# Patient Record
Sex: FEMALE | Race: WHITE | NOT HISPANIC OR LATINO | URBAN - METROPOLITAN AREA
[De-identification: names, ages, dates, MRNs, and addresses within clinical notes are randomized per-mention and may not be internally consistent; named-entity substitution may affect disease eponyms.]

---

## 2020-01-01 ENCOUNTER — EMERGENCY (EMERGENCY)
Facility: HOSPITAL | Age: 85
LOS: 1 days | Discharge: HOME | End: 2020-01-01
Attending: EMERGENCY MEDICINE | Admitting: EMERGENCY MEDICINE
Payer: MEDICARE

## 2020-01-01 VITALS
HEART RATE: 87 BPM | WEIGHT: 115.08 LBS | OXYGEN SATURATION: 98 % | SYSTOLIC BLOOD PRESSURE: 174 MMHG | HEIGHT: 63 IN | TEMPERATURE: 98 F | RESPIRATION RATE: 18 BRPM | DIASTOLIC BLOOD PRESSURE: 83 MMHG

## 2020-01-01 VITALS
OXYGEN SATURATION: 99 % | SYSTOLIC BLOOD PRESSURE: 151 MMHG | TEMPERATURE: 98 F | HEART RATE: 81 BPM | RESPIRATION RATE: 16 BRPM | DIASTOLIC BLOOD PRESSURE: 73 MMHG

## 2020-01-01 DIAGNOSIS — Z96.642 PRESENCE OF LEFT ARTIFICIAL HIP JOINT: Chronic | ICD-10-CM

## 2020-01-01 DIAGNOSIS — Z90.710 ACQUIRED ABSENCE OF BOTH CERVIX AND UTERUS: Chronic | ICD-10-CM

## 2020-01-01 LAB
ALBUMIN SERPL ELPH-MCNC: 3.5 G/DL — SIGNIFICANT CHANGE UP (ref 3.3–5)
ALP SERPL-CCNC: 56 U/L — SIGNIFICANT CHANGE UP (ref 40–120)
ALT FLD-CCNC: 19 U/L — SIGNIFICANT CHANGE UP (ref 12–78)
ANION GAP SERPL CALC-SCNC: 7 MMOL/L — SIGNIFICANT CHANGE UP (ref 5–17)
APPEARANCE UR: CLEAR — SIGNIFICANT CHANGE UP
AST SERPL-CCNC: 22 U/L — SIGNIFICANT CHANGE UP (ref 15–37)
BASOPHILS # BLD AUTO: 0.04 K/UL — SIGNIFICANT CHANGE UP (ref 0–0.2)
BASOPHILS NFR BLD AUTO: 0.5 % — SIGNIFICANT CHANGE UP (ref 0–2)
BILIRUB SERPL-MCNC: 0.6 MG/DL — SIGNIFICANT CHANGE UP (ref 0.2–1.2)
BILIRUB UR-MCNC: NEGATIVE — SIGNIFICANT CHANGE UP
BUN SERPL-MCNC: 8 MG/DL — SIGNIFICANT CHANGE UP (ref 7–23)
CALCIUM SERPL-MCNC: 8.9 MG/DL — SIGNIFICANT CHANGE UP (ref 8.5–10.1)
CHLORIDE SERPL-SCNC: 98 MMOL/L — SIGNIFICANT CHANGE UP (ref 96–108)
CO2 SERPL-SCNC: 28 MMOL/L — SIGNIFICANT CHANGE UP (ref 22–31)
COLOR SPEC: SIGNIFICANT CHANGE UP
CREAT SERPL-MCNC: 0.6 MG/DL — SIGNIFICANT CHANGE UP (ref 0.5–1.3)
CULTURE RESULTS: NO GROWTH — SIGNIFICANT CHANGE UP
DIFF PNL FLD: ABNORMAL
EOSINOPHIL # BLD AUTO: 0.3 K/UL — SIGNIFICANT CHANGE UP (ref 0–0.5)
EOSINOPHIL NFR BLD AUTO: 3.8 % — SIGNIFICANT CHANGE UP (ref 0–6)
FLU A RESULT: SIGNIFICANT CHANGE UP
FLU A RESULT: SIGNIFICANT CHANGE UP
FLUAV AG NPH QL: SIGNIFICANT CHANGE UP
FLUBV AG NPH QL: SIGNIFICANT CHANGE UP
GLUCOSE SERPL-MCNC: 116 MG/DL — HIGH (ref 70–99)
GLUCOSE UR QL: NEGATIVE — SIGNIFICANT CHANGE UP
HCT VFR BLD CALC: 40.8 % — SIGNIFICANT CHANGE UP (ref 34.5–45)
HGB BLD-MCNC: 13.8 G/DL — SIGNIFICANT CHANGE UP (ref 11.5–15.5)
IMM GRANULOCYTES NFR BLD AUTO: 0.3 % — SIGNIFICANT CHANGE UP (ref 0–1.5)
KETONES UR-MCNC: NEGATIVE — SIGNIFICANT CHANGE UP
LACTATE SERPL-SCNC: 1.4 MMOL/L — SIGNIFICANT CHANGE UP (ref 0.7–2)
LEUKOCYTE ESTERASE UR-ACNC: NEGATIVE — SIGNIFICANT CHANGE UP
LIDOCAIN IGE QN: 186 U/L — SIGNIFICANT CHANGE UP (ref 73–393)
LYMPHOCYTES # BLD AUTO: 0.87 K/UL — LOW (ref 1–3.3)
LYMPHOCYTES # BLD AUTO: 10.9 % — LOW (ref 13–44)
MCHC RBC-ENTMCNC: 31.2 PG — SIGNIFICANT CHANGE UP (ref 27–34)
MCHC RBC-ENTMCNC: 33.8 GM/DL — SIGNIFICANT CHANGE UP (ref 32–36)
MCV RBC AUTO: 92.1 FL — SIGNIFICANT CHANGE UP (ref 80–100)
MONOCYTES # BLD AUTO: 0.56 K/UL — SIGNIFICANT CHANGE UP (ref 0–0.9)
MONOCYTES NFR BLD AUTO: 7 % — SIGNIFICANT CHANGE UP (ref 2–14)
NEUTROPHILS # BLD AUTO: 6.21 K/UL — SIGNIFICANT CHANGE UP (ref 1.8–7.4)
NEUTROPHILS NFR BLD AUTO: 77.5 % — HIGH (ref 43–77)
NITRITE UR-MCNC: NEGATIVE — SIGNIFICANT CHANGE UP
NRBC # BLD: 0 /100 WBCS — SIGNIFICANT CHANGE UP (ref 0–0)
PH UR: 8 — SIGNIFICANT CHANGE UP (ref 5–8)
PLATELET # BLD AUTO: 264 K/UL — SIGNIFICANT CHANGE UP (ref 150–400)
POTASSIUM SERPL-MCNC: 3.3 MMOL/L — LOW (ref 3.5–5.3)
POTASSIUM SERPL-SCNC: 3.3 MMOL/L — LOW (ref 3.5–5.3)
PROT SERPL-MCNC: 7.3 G/DL — SIGNIFICANT CHANGE UP (ref 6–8.3)
PROT UR-MCNC: NEGATIVE — SIGNIFICANT CHANGE UP
RBC # BLD: 4.43 M/UL — SIGNIFICANT CHANGE UP (ref 3.8–5.2)
RBC # FLD: 12.9 % — SIGNIFICANT CHANGE UP (ref 10.3–14.5)
RSV RESULT: SIGNIFICANT CHANGE UP
RSV RNA RESP QL NAA+PROBE: SIGNIFICANT CHANGE UP
SODIUM SERPL-SCNC: 133 MMOL/L — LOW (ref 135–145)
SP GR SPEC: 1.01 — SIGNIFICANT CHANGE UP (ref 1.01–1.02)
SPECIMEN SOURCE: SIGNIFICANT CHANGE UP
UROBILINOGEN FLD QL: NEGATIVE — SIGNIFICANT CHANGE UP
WBC # BLD: 8 K/UL — SIGNIFICANT CHANGE UP (ref 3.8–10.5)
WBC # FLD AUTO: 8 K/UL — SIGNIFICANT CHANGE UP (ref 3.8–10.5)

## 2020-01-01 PROCEDURE — 99284 EMERGENCY DEPT VISIT MOD MDM: CPT | Mod: 25

## 2020-01-01 PROCEDURE — 74177 CT ABD & PELVIS W/CONTRAST: CPT | Mod: 26

## 2020-01-01 PROCEDURE — 83605 ASSAY OF LACTIC ACID: CPT

## 2020-01-01 PROCEDURE — 87631 RESP VIRUS 3-5 TARGETS: CPT

## 2020-01-01 PROCEDURE — 93005 ELECTROCARDIOGRAM TRACING: CPT

## 2020-01-01 PROCEDURE — 87086 URINE CULTURE/COLONY COUNT: CPT

## 2020-01-01 PROCEDURE — 93010 ELECTROCARDIOGRAM REPORT: CPT

## 2020-01-01 PROCEDURE — 71046 X-RAY EXAM CHEST 2 VIEWS: CPT

## 2020-01-01 PROCEDURE — 83690 ASSAY OF LIPASE: CPT

## 2020-01-01 PROCEDURE — 96374 THER/PROPH/DIAG INJ IV PUSH: CPT | Mod: XU

## 2020-01-01 PROCEDURE — 96376 TX/PRO/DX INJ SAME DRUG ADON: CPT

## 2020-01-01 PROCEDURE — 80053 COMPREHEN METABOLIC PANEL: CPT

## 2020-01-01 PROCEDURE — 96375 TX/PRO/DX INJ NEW DRUG ADDON: CPT

## 2020-01-01 PROCEDURE — 96361 HYDRATE IV INFUSION ADD-ON: CPT

## 2020-01-01 PROCEDURE — 74177 CT ABD & PELVIS W/CONTRAST: CPT

## 2020-01-01 PROCEDURE — 99285 EMERGENCY DEPT VISIT HI MDM: CPT

## 2020-01-01 PROCEDURE — 81001 URINALYSIS AUTO W/SCOPE: CPT

## 2020-01-01 PROCEDURE — 36415 COLL VENOUS BLD VENIPUNCTURE: CPT

## 2020-01-01 PROCEDURE — 85027 COMPLETE CBC AUTOMATED: CPT

## 2020-01-01 PROCEDURE — 71046 X-RAY EXAM CHEST 2 VIEWS: CPT | Mod: 26

## 2020-01-01 RX ORDER — SOTALOL HCL 120 MG
1 TABLET ORAL
Qty: 0 | Refills: 0 | DISCHARGE

## 2020-01-01 RX ORDER — POTASSIUM CHLORIDE 20 MEQ
20 PACKET (EA) ORAL ONCE
Refills: 0 | Status: COMPLETED | OUTPATIENT
Start: 2020-01-01 | End: 2020-01-01

## 2020-01-01 RX ORDER — ONDANSETRON 8 MG/1
4 TABLET, FILM COATED ORAL ONCE
Refills: 0 | Status: COMPLETED | OUTPATIENT
Start: 2020-01-01 | End: 2020-01-01

## 2020-01-01 RX ORDER — SODIUM CHLORIDE 9 MG/ML
1000 INJECTION INTRAMUSCULAR; INTRAVENOUS; SUBCUTANEOUS ONCE
Refills: 0 | Status: COMPLETED | OUTPATIENT
Start: 2020-01-01 | End: 2020-01-01

## 2020-01-01 RX ORDER — PANTOPRAZOLE SODIUM 20 MG/1
40 TABLET, DELAYED RELEASE ORAL ONCE
Refills: 0 | Status: COMPLETED | OUTPATIENT
Start: 2020-01-01 | End: 2020-01-01

## 2020-01-01 RX ORDER — KETOROLAC TROMETHAMINE 30 MG/ML
15 SYRINGE (ML) INJECTION ONCE
Refills: 0 | Status: DISCONTINUED | OUTPATIENT
Start: 2020-01-01 | End: 2020-01-01

## 2020-01-01 RX ORDER — PANTOPRAZOLE SODIUM 20 MG/1
1 TABLET, DELAYED RELEASE ORAL
Qty: 0 | Refills: 0 | DISCHARGE

## 2020-01-01 RX ORDER — SIMVASTATIN 20 MG/1
1 TABLET, FILM COATED ORAL
Qty: 0 | Refills: 0 | DISCHARGE

## 2020-01-01 RX ADMIN — ONDANSETRON 4 MILLIGRAM(S): 8 TABLET, FILM COATED ORAL at 13:44

## 2020-01-01 RX ADMIN — PANTOPRAZOLE SODIUM 40 MILLIGRAM(S): 20 TABLET, DELAYED RELEASE ORAL at 16:42

## 2020-01-01 RX ADMIN — SODIUM CHLORIDE 1000 MILLILITER(S): 9 INJECTION INTRAMUSCULAR; INTRAVENOUS; SUBCUTANEOUS at 14:44

## 2020-01-01 RX ADMIN — Medication 20 MILLIEQUIVALENT(S): at 16:42

## 2020-01-01 RX ADMIN — SODIUM CHLORIDE 1000 MILLILITER(S): 9 INJECTION INTRAMUSCULAR; INTRAVENOUS; SUBCUTANEOUS at 13:44

## 2020-01-01 RX ADMIN — Medication 15 MILLIGRAM(S): at 16:42

## 2020-01-01 RX ADMIN — ONDANSETRON 4 MILLIGRAM(S): 8 TABLET, FILM COATED ORAL at 16:42

## 2020-03-01 NOTE — ED PROVIDER NOTE - ATTENDING CONTRIBUTION TO CARE
I have personally performed a face to face diagnostic evaluation on this patient.  I have reviewed the PA note and agree with the history, exam, and plan of care, except as noted.  History and Exam by me shows patient brought in by daughter c/o posterior neck pain, body aches, nausea, vomiting, unable to tolerate po, patient states last week she had urinary burning, started macrobid and symptoms improved, has 2 days left, today not feeling well, abdominal discomfort, nausea, tries to eat or drink and vomits, patient alert and oriented, heart and lungs clear, abdomen soft, no focal weakness, f/u labs, ct abdomen/pelvis, anti-emetics, iv fluids, re-eval.

## 2020-03-01 NOTE — ED ADULT NURSE NOTE - OBJECTIVE STATEMENT
89 y/o female, presents to the ed c/o of nausea and vomiting that started today. she was diagnosed with a uti on tuesday and started on macrobid. the nausea started wednesday and vomiting started today. she denies fever chills chest pain sob headache abdominal pain and dysuria.

## 2020-03-01 NOTE — ED PROVIDER NOTE - PROGRESS NOTE DETAILS
patient tolerated po in ED, no episode of vomiting, feels well, requesting discharge, daughter at bedside, results discussed, given information for GI Dr Cohen, rx zofran and protonix sent to pharmacy,  advised if any concerns return to ED,  advised stop macrobid medication, follow up with pmd Dr Moran, call tomorrow to arrange follow up , advance diet as tolerated, starting with clears

## 2020-03-01 NOTE — ED PROVIDER NOTE - OBJECTIVE STATEMENT
89 y female brought to ED by daughter, patient states she had an episode of vomiting today, as per daughter patient is presently being treated for uti, is on Macrobid, prescribed Tuesday, felt the medication has "upset her stomach", spoke with her pmd Dr Moran about the medication reaction, advised continue medication,  today she vomited went to urgent care, sent to ED for evaluation, patient denies abdominal pain, no fever, no pain with urination, no diarrhea, no recent illness, or falls, no recent travel, no sick contacts.  denies chest pain no sob.  states today has no appetitie, has not eaten, tried to drink sips of water and vomited.  PMH:  htn, hld,  had total hysterectomy 1982.

## 2020-03-01 NOTE — ED PROVIDER NOTE - CHPI ED SYMPTOMS NEG
no diarrhea/no fever/no blood in stool/no hematuria/no chills/no dysuria/no burning urination/no abdominal distension

## 2020-03-01 NOTE — ED PROVIDER NOTE - PATIENT PORTAL LINK FT
You can access the FollowMyHealth Patient Portal offered by [facility] by registering at the following website: [email or web address]. By joining [facility]’s FollowMyHealth portal, you will also be able to view your health information using other applications (apps) compatible with our system.

## 2020-03-01 NOTE — ED PROVIDER NOTE - CLINICAL SUMMARY MEDICAL DECISION MAKING FREE TEXT BOX
episode of vomiting today, recently diagnosed uti, on macrobid,  will obtain labs, ekg, ct, cxr, r/o flu, ua, give ivf, iv med

## 2020-03-01 NOTE — ED PROVIDER NOTE - NSFOLLOWUPINSTRUCTIONS_ED_ALL_ED_FT
follow up with GI Dr kirkland,call tomorrow to arrange follow up  follow up with pmd Dr Moran  stop macrobid medication  advance diet as tolerated start with clears  return to ED if any concerns

## 2020-03-01 NOTE — ED PROVIDER NOTE - CARE PROVIDER_API CALL
Markell Cohen ()  Internal Medicine  [address] [place] [address]  Phone: [phone]  Fax: [phone]  Follow Up Time:

## 2021-01-01 ENCOUNTER — INPATIENT (INPATIENT)
Facility: HOSPITAL | Age: 86
LOS: 0 days | DRG: 284 | End: 2021-01-11
Attending: INTERNAL MEDICINE | Admitting: STUDENT IN AN ORGANIZED HEALTH CARE EDUCATION/TRAINING PROGRAM
Payer: MEDICARE

## 2021-01-01 VITALS
HEIGHT: 63 IN | SYSTOLIC BLOOD PRESSURE: 119 MMHG | WEIGHT: 115.08 LBS | OXYGEN SATURATION: 98 % | TEMPERATURE: 98 F | HEART RATE: 126 BPM | DIASTOLIC BLOOD PRESSURE: 78 MMHG | RESPIRATION RATE: 28 BRPM

## 2021-01-01 VITALS — HEART RATE: 78 BPM | DIASTOLIC BLOOD PRESSURE: 42 MMHG | SYSTOLIC BLOOD PRESSURE: 65 MMHG

## 2021-01-01 DIAGNOSIS — T14.8XXA OTHER INJURY OF UNSPECIFIED BODY REGION, INITIAL ENCOUNTER: ICD-10-CM

## 2021-01-01 DIAGNOSIS — Z96.642 PRESENCE OF LEFT ARTIFICIAL HIP JOINT: Chronic | ICD-10-CM

## 2021-01-01 DIAGNOSIS — Z90.710 ACQUIRED ABSENCE OF BOTH CERVIX AND UTERUS: Chronic | ICD-10-CM

## 2021-01-01 LAB
ACANTHOCYTES BLD QL SMEAR: SLIGHT — SIGNIFICANT CHANGE UP
ALBUMIN SERPL ELPH-MCNC: 3.6 G/DL — SIGNIFICANT CHANGE UP (ref 3.3–5.2)
ALBUMIN SERPL ELPH-MCNC: 4 G/DL — SIGNIFICANT CHANGE UP (ref 3.3–5.2)
ALP SERPL-CCNC: 49 U/L — SIGNIFICANT CHANGE UP (ref 40–120)
ALP SERPL-CCNC: 49 U/L — SIGNIFICANT CHANGE UP (ref 40–120)
ALT FLD-CCNC: 14 U/L — SIGNIFICANT CHANGE UP
ALT FLD-CCNC: 17 U/L — SIGNIFICANT CHANGE UP
ANION GAP SERPL CALC-SCNC: 10 MMOL/L — SIGNIFICANT CHANGE UP (ref 5–17)
ANION GAP SERPL CALC-SCNC: 12 MMOL/L — SIGNIFICANT CHANGE UP (ref 5–17)
ANISOCYTOSIS BLD QL: SLIGHT — SIGNIFICANT CHANGE UP
APPEARANCE UR: CLEAR — SIGNIFICANT CHANGE UP
APTT BLD: 25.5 SEC — LOW (ref 27.5–35.5)
APTT BLD: 26.1 SEC — LOW (ref 27.5–35.5)
AST SERPL-CCNC: 21 U/L — SIGNIFICANT CHANGE UP
AST SERPL-CCNC: 27 U/L — SIGNIFICANT CHANGE UP
BACTERIA # UR AUTO: ABNORMAL
BASOPHILS # BLD AUTO: 0 K/UL — SIGNIFICANT CHANGE UP (ref 0–0.2)
BASOPHILS # BLD AUTO: 0.02 K/UL — SIGNIFICANT CHANGE UP (ref 0–0.2)
BASOPHILS NFR BLD AUTO: 0 % — SIGNIFICANT CHANGE UP (ref 0–2)
BASOPHILS NFR BLD AUTO: 0.1 % — SIGNIFICANT CHANGE UP (ref 0–2)
BILIRUB SERPL-MCNC: 1.2 MG/DL — SIGNIFICANT CHANGE UP (ref 0.4–2)
BILIRUB SERPL-MCNC: 1.2 MG/DL — SIGNIFICANT CHANGE UP (ref 0.4–2)
BILIRUB UR-MCNC: NEGATIVE — SIGNIFICANT CHANGE UP
BLD GP AB SCN SERPL QL: SIGNIFICANT CHANGE UP
BUN SERPL-MCNC: 29 MG/DL — HIGH (ref 8–20)
BUN SERPL-MCNC: 32 MG/DL — HIGH (ref 8–20)
BURR CELLS BLD QL SMEAR: PRESENT — SIGNIFICANT CHANGE UP
CALCIUM SERPL-MCNC: 9.2 MG/DL — SIGNIFICANT CHANGE UP (ref 8.6–10.2)
CALCIUM SERPL-MCNC: 9.3 MG/DL — SIGNIFICANT CHANGE UP (ref 8.6–10.2)
CHLORIDE SERPL-SCNC: 101 MMOL/L — SIGNIFICANT CHANGE UP (ref 98–107)
CHLORIDE SERPL-SCNC: 97 MMOL/L — LOW (ref 98–107)
CK SERPL-CCNC: 32 U/L — SIGNIFICANT CHANGE UP (ref 25–170)
CK SERPL-CCNC: 44 U/L — SIGNIFICANT CHANGE UP (ref 25–170)
CO2 SERPL-SCNC: 21 MMOL/L — LOW (ref 22–29)
CO2 SERPL-SCNC: 24 MMOL/L — SIGNIFICANT CHANGE UP (ref 22–29)
COLOR SPEC: YELLOW — SIGNIFICANT CHANGE UP
CREAT SERPL-MCNC: 0.68 MG/DL — SIGNIFICANT CHANGE UP (ref 0.5–1.3)
CREAT SERPL-MCNC: 0.79 MG/DL — SIGNIFICANT CHANGE UP (ref 0.5–1.3)
CRP SERPL-MCNC: 24.24 MG/DL — HIGH (ref 0–0.4)
CULTURE RESULTS: SIGNIFICANT CHANGE UP
DIFF PNL FLD: ABNORMAL
ELLIPTOCYTES BLD QL SMEAR: SLIGHT — SIGNIFICANT CHANGE UP
EOSINOPHIL # BLD AUTO: 0 K/UL — SIGNIFICANT CHANGE UP (ref 0–0.5)
EOSINOPHIL # BLD AUTO: 0 K/UL — SIGNIFICANT CHANGE UP (ref 0–0.5)
EOSINOPHIL NFR BLD AUTO: 0 % — SIGNIFICANT CHANGE UP (ref 0–6)
EOSINOPHIL NFR BLD AUTO: 0 % — SIGNIFICANT CHANGE UP (ref 0–6)
EPI CELLS # UR: SIGNIFICANT CHANGE UP
ERYTHROCYTE [SEDIMENTATION RATE] IN BLOOD: 38 MM/HR — HIGH (ref 0–20)
GIANT PLATELETS BLD QL SMEAR: PRESENT — SIGNIFICANT CHANGE UP
GLUCOSE BLDC GLUCOMTR-MCNC: 167 MG/DL — HIGH (ref 70–99)
GLUCOSE SERPL-MCNC: 126 MG/DL — HIGH (ref 70–99)
GLUCOSE SERPL-MCNC: 137 MG/DL — HIGH (ref 70–99)
GLUCOSE UR QL: NEGATIVE MG/DL — SIGNIFICANT CHANGE UP
HCT VFR BLD CALC: 35.9 % — SIGNIFICANT CHANGE UP (ref 34.5–45)
HCT VFR BLD CALC: 36.5 % — SIGNIFICANT CHANGE UP (ref 34.5–45)
HGB BLD-MCNC: 11.7 G/DL — SIGNIFICANT CHANGE UP (ref 11.5–15.5)
HGB BLD-MCNC: 12 G/DL — SIGNIFICANT CHANGE UP (ref 11.5–15.5)
IMM GRANULOCYTES NFR BLD AUTO: 0.7 % — SIGNIFICANT CHANGE UP (ref 0–1.5)
INR BLD: 1.36 RATIO — HIGH (ref 0.88–1.16)
INR BLD: 1.42 RATIO — HIGH (ref 0.88–1.16)
KETONES UR-MCNC: ABNORMAL
LEUKOCYTE ESTERASE UR-ACNC: ABNORMAL
LYMPHOCYTES # BLD AUTO: 1.11 K/UL — SIGNIFICANT CHANGE UP (ref 1–3.3)
LYMPHOCYTES # BLD AUTO: 1.42 K/UL — SIGNIFICANT CHANGE UP (ref 1–3.3)
LYMPHOCYTES # BLD AUTO: 7.5 % — LOW (ref 13–44)
LYMPHOCYTES # BLD AUTO: 8.8 % — LOW (ref 13–44)
MACROCYTES BLD QL: SLIGHT — SIGNIFICANT CHANGE UP
MAGNESIUM SERPL-MCNC: 2.2 MG/DL — SIGNIFICANT CHANGE UP (ref 1.8–2.6)
MAGNESIUM SERPL-MCNC: 2.2 MG/DL — SIGNIFICANT CHANGE UP (ref 1.8–2.6)
MANUAL SMEAR VERIFICATION: SIGNIFICANT CHANGE UP
MCHC RBC-ENTMCNC: 31.7 PG — SIGNIFICANT CHANGE UP (ref 27–34)
MCHC RBC-ENTMCNC: 32.1 PG — SIGNIFICANT CHANGE UP (ref 27–34)
MCHC RBC-ENTMCNC: 32.6 GM/DL — SIGNIFICANT CHANGE UP (ref 32–36)
MCHC RBC-ENTMCNC: 32.9 GM/DL — SIGNIFICANT CHANGE UP (ref 32–36)
MCV RBC AUTO: 97.3 FL — SIGNIFICANT CHANGE UP (ref 80–100)
MCV RBC AUTO: 97.6 FL — SIGNIFICANT CHANGE UP (ref 80–100)
MICROCYTES BLD QL: SLIGHT — SIGNIFICANT CHANGE UP
MONOCYTES # BLD AUTO: 1.92 K/UL — HIGH (ref 0–0.9)
MONOCYTES # BLD AUTO: 2.54 K/UL — HIGH (ref 0–0.9)
MONOCYTES NFR BLD AUTO: 13 % — SIGNIFICANT CHANGE UP (ref 2–14)
MONOCYTES NFR BLD AUTO: 15.8 % — HIGH (ref 2–14)
NEUTROPHILS # BLD AUTO: 11.62 K/UL — HIGH (ref 1.8–7.4)
NEUTROPHILS # BLD AUTO: 11.99 K/UL — HIGH (ref 1.8–7.4)
NEUTROPHILS NFR BLD AUTO: 74.5 % — SIGNIFICANT CHANGE UP (ref 43–77)
NEUTROPHILS NFR BLD AUTO: 78.7 % — HIGH (ref 43–77)
NITRITE UR-MCNC: NEGATIVE — SIGNIFICANT CHANGE UP
NT-PROBNP SERPL-SCNC: 3234 PG/ML — HIGH (ref 0–300)
NT-PROBNP SERPL-SCNC: 4983 PG/ML — HIGH (ref 0–300)
OVALOCYTES BLD QL SMEAR: SLIGHT — SIGNIFICANT CHANGE UP
PH UR: 5 — SIGNIFICANT CHANGE UP (ref 5–8)
PHOSPHATE SERPL-MCNC: 2.7 MG/DL — SIGNIFICANT CHANGE UP (ref 2.4–4.7)
PLAT MORPH BLD: NORMAL — SIGNIFICANT CHANGE UP
PLATELET # BLD AUTO: 226 K/UL — SIGNIFICANT CHANGE UP (ref 150–400)
PLATELET # BLD AUTO: 227 K/UL — SIGNIFICANT CHANGE UP (ref 150–400)
POIKILOCYTOSIS BLD QL AUTO: SLIGHT — SIGNIFICANT CHANGE UP
POLYCHROMASIA BLD QL SMEAR: SLIGHT — SIGNIFICANT CHANGE UP
POTASSIUM SERPL-MCNC: 3.9 MMOL/L — SIGNIFICANT CHANGE UP (ref 3.5–5.3)
POTASSIUM SERPL-MCNC: 4.1 MMOL/L — SIGNIFICANT CHANGE UP (ref 3.5–5.3)
POTASSIUM SERPL-SCNC: 3.9 MMOL/L — SIGNIFICANT CHANGE UP (ref 3.5–5.3)
POTASSIUM SERPL-SCNC: 4.1 MMOL/L — SIGNIFICANT CHANGE UP (ref 3.5–5.3)
PROCALCITONIN SERPL-MCNC: 0.23 NG/ML — HIGH (ref 0.02–0.1)
PROT SERPL-MCNC: 6.4 G/DL — LOW (ref 6.6–8.7)
PROT SERPL-MCNC: 7 G/DL — SIGNIFICANT CHANGE UP (ref 6.6–8.7)
PROT UR-MCNC: 100 MG/DL
PROTHROM AB SERPL-ACNC: 15.5 SEC — HIGH (ref 10.6–13.6)
PROTHROM AB SERPL-ACNC: 16.2 SEC — HIGH (ref 10.6–13.6)
RBC # BLD: 3.69 M/UL — LOW (ref 3.8–5.2)
RBC # BLD: 3.74 M/UL — LOW (ref 3.8–5.2)
RBC # FLD: 13.3 % — SIGNIFICANT CHANGE UP (ref 10.3–14.5)
RBC # FLD: 13.4 % — SIGNIFICANT CHANGE UP (ref 10.3–14.5)
RBC BLD AUTO: ABNORMAL
RBC CASTS # UR COMP ASSIST: ABNORMAL /HPF (ref 0–4)
SARS-COV-2 IGG SERPL QL IA: NEGATIVE — SIGNIFICANT CHANGE UP
SARS-COV-2 IGM SERPL IA-ACNC: 0.07 INDEX — SIGNIFICANT CHANGE UP
SARS-COV-2 RNA SPEC QL NAA+PROBE: SIGNIFICANT CHANGE UP
SCHISTOCYTES BLD QL AUTO: SLIGHT — SIGNIFICANT CHANGE UP
SODIUM SERPL-SCNC: 130 MMOL/L — LOW (ref 135–145)
SODIUM SERPL-SCNC: 135 MMOL/L — SIGNIFICANT CHANGE UP (ref 135–145)
SP GR SPEC: 1.02 — SIGNIFICANT CHANGE UP (ref 1.01–1.02)
SPECIMEN SOURCE: SIGNIFICANT CHANGE UP
TROPONIN T SERPL-MCNC: 0.06 NG/ML — SIGNIFICANT CHANGE UP (ref 0–0.06)
TROPONIN T SERPL-MCNC: 0.07 NG/ML — HIGH (ref 0–0.06)
TROPONIN T SERPL-MCNC: 0.08 NG/ML — HIGH (ref 0–0.06)
TROPONIN T SERPL-MCNC: 0.08 NG/ML — HIGH (ref 0–0.06)
UROBILINOGEN FLD QL: NEGATIVE MG/DL — SIGNIFICANT CHANGE UP
VARIANT LYMPHS # BLD: 0.9 % — SIGNIFICANT CHANGE UP (ref 0–6)
WBC # BLD: 14.77 K/UL — HIGH (ref 3.8–10.5)
WBC # BLD: 16.1 K/UL — HIGH (ref 3.8–10.5)
WBC # FLD AUTO: 14.77 K/UL — HIGH (ref 3.8–10.5)
WBC # FLD AUTO: 16.1 K/UL — HIGH (ref 3.8–10.5)
WBC UR QL: ABNORMAL

## 2021-01-01 PROCEDURE — 99291 CRITICAL CARE FIRST HOUR: CPT

## 2021-01-01 PROCEDURE — 99233 SBSQ HOSP IP/OBS HIGH 50: CPT

## 2021-01-01 PROCEDURE — 71275 CT ANGIOGRAPHY CHEST: CPT | Mod: 26

## 2021-01-01 PROCEDURE — 93010 ELECTROCARDIOGRAM REPORT: CPT | Mod: 76

## 2021-01-01 PROCEDURE — 99223 1ST HOSP IP/OBS HIGH 75: CPT

## 2021-01-01 PROCEDURE — 99497 ADVNCD CARE PLAN 30 MIN: CPT | Mod: 25

## 2021-01-01 PROCEDURE — 71045 X-RAY EXAM CHEST 1 VIEW: CPT | Mod: 26

## 2021-01-01 RX ORDER — ONDANSETRON 8 MG/1
4 TABLET, FILM COATED ORAL EVERY 6 HOURS
Refills: 0 | Status: DISCONTINUED | OUTPATIENT
Start: 2021-01-01 | End: 2021-01-01

## 2021-01-01 RX ORDER — METOPROLOL TARTRATE 50 MG
5 TABLET ORAL ONCE
Refills: 0 | Status: COMPLETED | OUTPATIENT
Start: 2021-01-01 | End: 2021-01-01

## 2021-01-01 RX ORDER — METOPROLOL TARTRATE 50 MG
25 TABLET ORAL
Refills: 0 | Status: DISCONTINUED | OUTPATIENT
Start: 2021-01-01 | End: 2021-01-01

## 2021-01-01 RX ORDER — PANTOPRAZOLE SODIUM 20 MG/1
40 TABLET, DELAYED RELEASE ORAL
Refills: 0 | Status: DISCONTINUED | OUTPATIENT
Start: 2021-01-01 | End: 2021-01-01

## 2021-01-01 RX ORDER — MORPHINE SULFATE 50 MG/1
2 CAPSULE, EXTENDED RELEASE ORAL ONCE
Refills: 0 | Status: DISCONTINUED | OUTPATIENT
Start: 2021-01-01 | End: 2021-01-01

## 2021-01-01 RX ORDER — SODIUM CHLORIDE 9 MG/ML
1000 INJECTION INTRAMUSCULAR; INTRAVENOUS; SUBCUTANEOUS ONCE
Refills: 0 | Status: COMPLETED | OUTPATIENT
Start: 2021-01-01 | End: 2021-01-01

## 2021-01-01 RX ORDER — METOPROLOL TARTRATE 50 MG
25 TABLET ORAL EVERY 8 HOURS
Refills: 0 | Status: DISCONTINUED | OUTPATIENT
Start: 2021-01-01 | End: 2021-01-01

## 2021-01-01 RX ORDER — ATROPINE SULFATE 0.1 MG/ML
0.6 SYRINGE (ML) INJECTION ONCE
Refills: 0 | Status: DISCONTINUED | OUTPATIENT
Start: 2021-01-01 | End: 2021-01-01

## 2021-01-01 RX ORDER — METOPROLOL TARTRATE 50 MG
5 TABLET ORAL EVERY 6 HOURS
Refills: 0 | Status: DISCONTINUED | OUTPATIENT
Start: 2021-01-01 | End: 2021-01-01

## 2021-01-01 RX ORDER — CEFTRIAXONE 500 MG/1
INJECTION, POWDER, FOR SOLUTION INTRAMUSCULAR; INTRAVENOUS
Refills: 0 | Status: DISCONTINUED | OUTPATIENT
Start: 2021-01-01 | End: 2021-01-01

## 2021-01-01 RX ORDER — ASPIRIN/CALCIUM CARB/MAGNESIUM 324 MG
1 TABLET ORAL
Qty: 0 | Refills: 0 | DISCHARGE

## 2021-01-01 RX ORDER — ACETAMINOPHEN 500 MG
650 TABLET ORAL EVERY 6 HOURS
Refills: 0 | Status: DISCONTINUED | OUTPATIENT
Start: 2021-01-01 | End: 2021-01-01

## 2021-01-01 RX ORDER — SIMVASTATIN 20 MG/1
40 TABLET, FILM COATED ORAL AT BEDTIME
Refills: 0 | Status: DISCONTINUED | OUTPATIENT
Start: 2021-01-01 | End: 2021-01-01

## 2021-01-01 RX ORDER — ACETAMINOPHEN 500 MG
1000 TABLET ORAL ONCE
Refills: 0 | Status: COMPLETED | OUTPATIENT
Start: 2021-01-01 | End: 2021-01-01

## 2021-01-01 RX ORDER — LABETALOL HCL 100 MG
10 TABLET ORAL ONCE
Refills: 0 | Status: DISCONTINUED | OUTPATIENT
Start: 2021-01-01 | End: 2021-01-01

## 2021-01-01 RX ORDER — LATANOPROST 0.05 MG/ML
1 SOLUTION/ DROPS OPHTHALMIC; TOPICAL
Qty: 0 | Refills: 0 | DISCHARGE

## 2021-01-01 RX ORDER — APIXABAN 2.5 MG/1
2.5 TABLET, FILM COATED ORAL ONCE
Refills: 0 | Status: DISCONTINUED | OUTPATIENT
Start: 2021-01-01 | End: 2021-01-01

## 2021-01-01 RX ORDER — ROBINUL 0.2 MG/ML
0.2 INJECTION INTRAMUSCULAR; INTRAVENOUS ONCE
Refills: 0 | Status: DISCONTINUED | OUTPATIENT
Start: 2021-01-01 | End: 2021-01-01

## 2021-01-01 RX ORDER — SOTALOL HCL 120 MG
80 TABLET ORAL
Refills: 0 | Status: DISCONTINUED | OUTPATIENT
Start: 2021-01-01 | End: 2021-01-01

## 2021-01-01 RX ORDER — LATANOPROST 0.05 MG/ML
1 SOLUTION/ DROPS OPHTHALMIC; TOPICAL AT BEDTIME
Refills: 0 | Status: DISCONTINUED | OUTPATIENT
Start: 2021-01-01 | End: 2021-01-01

## 2021-01-01 RX ORDER — CEFTRIAXONE 500 MG/1
1000 INJECTION, POWDER, FOR SOLUTION INTRAMUSCULAR; INTRAVENOUS ONCE
Refills: 0 | Status: COMPLETED | OUTPATIENT
Start: 2021-01-01 | End: 2021-01-01

## 2021-01-01 RX ORDER — ACETAMINOPHEN 500 MG
650 TABLET ORAL ONCE
Refills: 0 | Status: COMPLETED | OUTPATIENT
Start: 2021-01-01 | End: 2021-01-01

## 2021-01-01 RX ORDER — SODIUM CHLORIDE 9 MG/ML
1000 INJECTION INTRAMUSCULAR; INTRAVENOUS; SUBCUTANEOUS
Refills: 0 | Status: DISCONTINUED | OUTPATIENT
Start: 2021-01-01 | End: 2021-01-01

## 2021-01-01 RX ORDER — CEFTRIAXONE 500 MG/1
1000 INJECTION, POWDER, FOR SOLUTION INTRAMUSCULAR; INTRAVENOUS EVERY 24 HOURS
Refills: 0 | Status: DISCONTINUED | OUTPATIENT
Start: 2021-01-12 | End: 2021-01-01

## 2021-01-01 RX ORDER — ACETYLCYSTEINE 200 MG/ML
1200 VIAL (ML) MISCELLANEOUS ONCE
Refills: 0 | Status: DISCONTINUED | OUTPATIENT
Start: 2021-01-01 | End: 2021-01-01

## 2021-01-01 RX ORDER — BRIMONIDINE TARTRATE, TIMOLOL MALEATE 2; 5 MG/ML; MG/ML
1 SOLUTION/ DROPS OPHTHALMIC
Qty: 0 | Refills: 0 | DISCHARGE

## 2021-01-01 RX ORDER — ACETAMINOPHEN 500 MG
650 TABLET ORAL ONCE
Refills: 0 | Status: DISCONTINUED | OUTPATIENT
Start: 2021-01-01 | End: 2021-01-01

## 2021-01-01 RX ADMIN — Medication 2.61 MG/KG/HR: at 22:18

## 2021-01-01 RX ADMIN — Medication 25 MILLIGRAM(S): at 13:33

## 2021-01-01 RX ADMIN — Medication 400 MILLIGRAM(S): at 20:19

## 2021-01-01 RX ADMIN — SODIUM CHLORIDE 1000 MILLILITER(S): 9 INJECTION INTRAMUSCULAR; INTRAVENOUS; SUBCUTANEOUS at 19:41

## 2021-01-01 RX ADMIN — Medication 650 MILLIGRAM(S): at 13:34

## 2021-01-01 RX ADMIN — Medication 5 MILLIGRAM(S): at 16:46

## 2021-01-01 RX ADMIN — PANTOPRAZOLE SODIUM 40 MILLIGRAM(S): 20 TABLET, DELAYED RELEASE ORAL at 06:03

## 2021-01-01 RX ADMIN — Medication 80 MILLIGRAM(S): at 17:27

## 2021-01-01 RX ADMIN — Medication 5 MILLIGRAM(S): at 17:17

## 2021-01-01 RX ADMIN — Medication 400 MILLIGRAM(S): at 16:48

## 2021-01-01 RX ADMIN — Medication 650 MILLIGRAM(S): at 06:18

## 2021-01-01 RX ADMIN — CEFTRIAXONE 1000 MILLIGRAM(S): 500 INJECTION, POWDER, FOR SOLUTION INTRAMUSCULAR; INTRAVENOUS at 05:22

## 2021-01-01 RX ADMIN — MORPHINE SULFATE 2 MILLIGRAM(S): 50 CAPSULE, EXTENDED RELEASE ORAL at 22:37

## 2021-01-10 PROBLEM — E78.5 HYPERLIPIDEMIA, UNSPECIFIED: Chronic | Status: ACTIVE | Noted: 2020-01-01

## 2021-01-10 PROBLEM — I10 ESSENTIAL (PRIMARY) HYPERTENSION: Chronic | Status: ACTIVE | Noted: 2020-01-01

## 2021-01-10 NOTE — H&P ADULT - HISTORY OF PRESENT ILLNESS
CC:  Patient is a 89y old  Female who presents with a chief complaint of chest pain radiating to back     HPI: 89 year old female with CAD s/p stents (2019), AF, HLD, GERD, PUD s/p bleed (plavix d/c'd presented with generalized weakness, upper chest pain and upper back pain. These symptomas had incresed after a recent  syncopal episode and fall 2 nights ago . The patient has had worsening dementia and declining functional status recently aslo needing a walker for ambulation. In the work up of combined chest/back pain  CTA of the chest found ascending aorta with peripheral intramural hematoma/dissection extending proximally to the level of the right coronary artery and distally to involve the aortic arch and great vessels and concern for rupture.     Pt evaluated by ER, MICU, CT SX, and Cardiology. Not a candidate for service. Pt declined by MICU. BP stable. LORIE correlates with systemic cuff pressures. DC [place]. Admit to SDU. Attempted to family x2 with no response. Pt with hx dementia unable to provide HPI/ROS/PMHX/Meds. Need to confirm med rec in AM. Discussed plan with RN at bedside. DC Eliquis for new onset afib 2/2 dissection.     Attempted to call family x2 for GOC/Code Status discussion but no answer. Shani (Daughter) [phone].

## 2021-01-10 NOTE — ED ADULT NURSE REASSESSMENT NOTE - NSIMPLEMENTINTERV_GEN_ALL_ED
Implemented All Fall with Harm Risk Interventions:  [place] to call system. Call bell, personal items and telephone within reach. Instruct patient to call for assistance. Room bathroom lighting operational. Non-slip footwear when patient is off stretcher. Physically safe environment: no spills, clutter or unnecessary equipment. Stretcher in lowest position, wheels locked, appropriate side rails in place. Provide visual cue, wrist band, yellow gown, etc. Monitor gait and stability. Monitor for mental status changes and reorient to person, place, and time. Review medications for side effects contributing to fall risk. Reinforce activity limits and safety measures with patient and family. Provide visual clues: red socks.

## 2021-01-10 NOTE — H&P ADULT - ASSESSMENT
ASSESSMENT  89 y/o F with recent Syncope, Chest/Back Pain admitted for Ascending Aortic Aneurysm with intramural hematoma, new onset AFIB RVR, small pericardial effusion    PLAN:  Ascending Aortic Dissection, Intramural Hematoma, Small Pericardial Effusion  -Ascending Aortic Dilatation to 6cm with focal dissection flap and intramural hematoma extending to R Coronary Artery/Aortic Arch/Great Vessels  -not a surgical candidate per CT SX  -family does not want aggressive surgical intervention with progressive dementia/functional decline per documentation  -currently hemodynamically stable at this time declined by MICU  -Monitor Telemetry/VS Q4  -s/p LORIE placed in ER by CTSX  -Systemic BP Cuff Pressures correlate 1:1 with Arterial Line use BP cuff going forward  -Remove LORIE prior to transfer to floor  -Aggressive BP control with BB  -repeat labs AM  -trend cardiac enzymes  -repeat BNP in AM  -VTE PPX SCDs only  -NPO  -[facility]/CT SX/MICU Consulted  -Guarded Prognosis  -COVID19 PCR Negative     New Onset AFIB RVR  -Likely 2/2 Dissection Above. Cont to Monitor Telemetry  -Cardiac Enzymes q4 x3.  -Replace Electrolytes PRN.  -Metoprolol Tartrate 25mg PO Q12 for rate control titrate as able goal HR <70  -Lopressor 5mg IVP q6 PRN for HR >100 for impulse control in setting of dissection and AF RVR  -Reported GIB on Plavix previously.   -No AC at this time due to risk/benefit in setting of Dissection and hx GIB on Plavix    Small Pericardial Effusion  -Echo as above. Repeat Echo in 48 hours to evaluate interval progression.     Leukocytosis, Acute UTI  -Ceftriaxone 1g IV for dirty UA and Leukocytosis. F/u UCX pending.     NAGMA  -Trend BMP. Likely decreased cardiac output 2/2 AFIB RVR and Aortic Dissection causing acidosis. Monitor closely.     Incidental Pulm Nodule/Opacity  -Unknown if new. RUL 3x12mm Tubular Opacity on CTA. 3 Month repeat CT follow-up as outpatient.     Goals of Care  -per documentation family declined aggressive measures and surgical intervention to MICU/CT SX  -Attempted to call family x2 to discuss further GOC/Aggressive and Heroic Measures and specifically code status in this 89 year old demented female with ascending aortic aneurysm and currently not addressed at time of admission  -Please call again in AM and fill out MOLST   -Palliative Care Consulted     Dementia, Med Rec  -Supportive Care. Pt demented unable to provide HPI/ROS/PMHX or Home Meds. Attempted call family x2 no answer. Please confirm med rec in AM.

## 2021-01-10 NOTE — ED PROVIDER NOTE - CLINICAL SUMMARY MEDICAL DECISION MAKING FREE TEXT BOX
Pt. with chest pain and back pain and near syncopal episode 2 days ago. Pt. also found to be in rapid A-fib, Possibly new onset. Will check labs/EKG/CTA of chest and cardio consult.

## 2021-01-10 NOTE — ED PROVIDER NOTE - PROGRESS NOTE DETAILS
Spoke to Dr. Jones who requested getting an Echo and starting pt. on Lopressor 25mg BID. PT. awaiting to go to CT scan. Spoke to patient and family regarding the patient's symptom. Spoke to CT surgical team who is currently at the bedside to evaluate for the aortic dissection. FAmily does not want any surgery to be performed. PT. seen by Medical ICU PA and does not meet criteria to be admitted to the ICU. Pt. will be admitted under the medicine team for cardiac monitoring and blood pressure control.

## 2021-01-10 NOTE — CONSULT NOTE ADULT - ATTENDING COMMENTS
Consulted for new onset atrial fibrillation.  recent syncope and prior coronary artery disease and history of Gi bleeding.  History of enlarged aorta.  CT scan shows concealed dissection or intra mural hematoma.   patient cannot tolerate anticoagulation for her atrial fibrillation due ot her aortic enalrgemedn and intra mural hematoma. Risk of worsening hematoma. moerover, she has pericardial effusion that may be blood.  Repeat Echo in 2 days to evaluate for worsenuing pericardial effusion./  CRP and ESR check.   probabyl afib due to blood in pericardial space. or intramural hematoma an incidental finding.   nevertheless, patietn not candidate for surgery  conservative management. CT surgery consult appreaciates. ct lopressor 25 mg Q12; Metoprolol 5 mg IV prn for heart rate > 100.  Will increase lopressor tomorrow for heart rate goal of <70 atleast.   Patient will not benefit from ICU as BP stable. admit to telemetry  Critical care cardiology.  I have personally provided critical care time > 45 mins excluding time spent on separate procedures.

## 2021-01-10 NOTE — CONSULT NOTE ADULT - ASSESSMENT
89yo female w/PMH CAD s/p stent (2019 in [place]), on ASA 81, Plavix DCd d/t GI bleeding ulcer, UTIs (at least 2x/yr).  Patient presents to ED c/o weakness. Per dtr Shani Brice ([phone]) pt had recent UTI in December, Friday night she had a syncopal episode and dtr caught her lowering her to the ground. Since the fall she has been c/o chest and back pain with movement and inspiration along with generalized weakness. In ED found to be in AFib (no known Hx of) w/HR 90-120s.     New onset AFib  -TTE  -TSH  -IVF  -Lopressor 25mg PO  -Eliquis 2.5mg PO BID  -CBC in 6days to eval Hgb after starting eliquis  -Protonix 40mg PO QD for GI protection    Weakness  -generalized  -eval for UTI    If TTE w/o significant abnormalities and rate controlled, no further inpatient cardiac w/u and may f/u as outpatient.  Thank you for allowing me to participate in care of your patient.   Please call as needed.     Assessment and recommendations are final when note is signed by the attending. 
89F h/o CAD, HLD, PUD, AF and dementia presents with chest and back pain and syncopal episode two days ago. On CT chest, it was found that the patient has an intramural hematoma of the ascending aorta. Dr Tobin was consulted and he spoke with the daughter who reports recent decline in functional status and increase in dementia. She does not want surgery for her mother at this time, and we agree that the extremely high risk of open surgery would likely outweigh any benefits.     We are recommending a stat echo, MICU admission, blood pressure and heart rate control and cardiology followup. Patient will not be a candidate for surgery.    Please recall if any questions.
89 year old female with intramural hematoma of the ascending aorta, family given the patients recent decline in functional status and increase in dementia does not want surgery. At this time she is hemodynamically stabe with stable heart rates and systolic blood pressure ranging in the low teens to high 20's. She does not meet criteria for admission to ICU given stable Bp. Recommendation to continue medical management of blood pressure, hold anticoagulates follow up eccho results and cardiology follow up as well. The above assessment and plan was discussed with EICU attending Dr Milian who aggress with admit to medicine service and monitor     Time spent: 30 mins assessing presenting problems of acute illness that poses high probability  end organ damage/dysfunction.  Medical decision making inculding plan of daily care, reviewing data, reviewing radiology, discussing with multidisciplinary team, non inclusive of procedures, discussing goals of care with patient/family

## 2021-01-10 NOTE — ED PROVIDER NOTE - OBJECTIVE STATEMENT
Pt BIBA from urgent care for weakness and SOB, pt found to be in rapid afib, unknown history, pt c/o pain to chest when moving which started last night, initial room air O2 84%, tested negative for covid at urgent care Pt BIBA from urgent care for weakness and SOB, pt found to be in rapid afib, unknown history, pt c/o pain to chest when moving which started last night, initial room air O2 84%, tested negative for covid at urgent care    As per daughter, friday night pt. became pale/pt. fainted, daughter brought pt. down to the ground. NO head trauma. Pt. started to complain of chest pain after the episode. EMT came to house and evaluated the patient, family did not want to take the patient to the hospital due to Covid concerns. Saturday, chest pain was less. PT. continued to complain of chest pain and back/shoulder pain today. Pt. has hx of stent placed in 2019 in [place]. Pt. has no hx of A-fib. EMT did do an EKG at the house and did not mention anything about A-fib. Pt BIBA from urgent care for weakness and SOB, pt found to be in rapid afib, unknown history, pt c/o pain to chest when moving which started last night. As per Triage note, initial room air O2 84%,. However pt. off the oxygen with O2 sat of 93%  on RA. Pt. had  tested negative for covid at urgent care.    As per daughter, friday night pt. became pale/pt. fainted, daughter brought pt. down to the ground. NO head trauma. Pt. started to complain of chest pain after the episode. EMT came to house and evaluated the patient, family did not want to take the patient to the hospital due to Covid concerns. Saturday, chest pain was less. PT. continued to complain of chest pain and back/shoulder pain today. Pt. has hx of stent placed in 2019 in [place]. Pt. has no hx of A-fib. EMT did do an EKG at the house and did not mention anything about A-fib.

## 2021-01-10 NOTE — ED ADULT NURSE NOTE - INTERVENTIONS DEFINITIONS
[place] to call system/Call bell, personal items and telephone within reach/Non-slip footwear when patient is off stretcher/Stretcher in lowest position, wheels locked, appropriate side rails in place

## 2021-01-10 NOTE — H&P ADULT - NSHPLABSRESULTS_GEN_ALL_CORE
EKG: Atrial fibrillation with Rapid Ventricular Response  Minimal voltage criteria for LVH, may be normal variant  Septal infarct , age undetermined  Nonspecific T wave abnormality  Abnormal ECG    CXR: IMPRESSION: Basilar pleural pulmonary processes. COPD.    CTA CHEST: IMPRESSION:  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 Small pericardial effusion measures above fluid density. Aortic rupture not excluded.  Right upper lobe 3 x 12 mm tubular opacity. Three-month follow-up CT recommended    2dEcho: Summary:   1. Technically limited study.   2. Normal left atrial size.   3. There is mild concentric left ventricular hypertrophy.   4. Hyperdynamic wall motion. Left ventricular ejection fraction, by visual estimation, is >75%.   5. Normal right atrial size.   6. Normal right ventricular size and function.   7. Moderate aortic regurgitation.   8. Moderate tricuspid regurgitation.   9. Small pericardial effusion.  10. Dilatation of the ascending aorta 5.4 cm , transverse aorta and aortic root. There is a linear density noted at the Non coronary cusp. No obvious dissection flap. No prior study to compare. Cannot rule out intramural hematoma and/or concealed dissection. CT chest is more accurate to evaluate for such findings.

## 2021-01-10 NOTE — H&P ADULT - NSHPPHYSICALEXAM_GEN_ALL_CORE
Vital Signs Last 24 Hrs  T(C): 37.2 (10 Jarrell 2021 23:46), Max: 37.8 (10 Jarrell 2021 15:13)  T(F): 98.9 (10 Jarrell 2021 23:46), Max: 100.1 (10 Jarrell 2021 15:13)  HR: 94 (10 Jarrell 2021 23:46) (86 - 128)  BP: 129/85 (10 Jarrell 2021 23:46) (110/75 - 135/66)  BP(mean): 101 (10 Jarrell 2021 23:46) (101 - 101)  RR: 16 (10 Jarrell 2021 23:46) (16 - 28)  SpO2: 95% (10 Jarrell 2021 23:46) (92% - 98%)    Constitutional: Well-appearing, NAD, VSS  Head: NC/AT  Eyes: PERRL, EOMI, anicteric sclera, conjunctiva WNL  ENT: Normal Pharynx, MMM, No tonsillar exudate/erythema  Neck: Supple, Non-tender  Chest: Non-tender, no rashes  Cardio: +Tachycardia, s1/s2  Resp: BS CTA bilaterally, no wheezing/rhonchi/rales  Abd: Soft, Non-tender, Non-distended, no rebound/guarding/rigidity  MSK: moving all extremities, no motor weakness, full ROM x4  Ext: palpable distal pulses, good capillary refill, no clubbing/cyanosis/edema, +LORIE in place  Psych: appropriate, cooperative, confused  Neuro: CN II-XII grossly intact, no focal deficits  Skin: Warm/Dry. No rashes.

## 2021-01-10 NOTE — ED ADULT NURSE NOTE - NSIMPLEMENTINTERV_GEN_ALL_ED
Patient has intermittent sharp pain and swelling at the base of thumb of right hand.  Sometimes drops objects due to the pain.  Will provide Voltaren gel.  Advised that he can use ice, wrap, brace.  Will provide referral to orthopedic in [place].  
Patient has swelling both thumbs at the base of his thumbs this is a chronic condition ongoing for about a year.  He notes the steroid taper had worked for his back in the past.  He declines referral to sports medicine for steroid injection as it would require driving to [place].   
Specific interventions were implemented:

## 2021-01-10 NOTE — CONSULT NOTE ADULT - SUBJECTIVE AND OBJECTIVE BOX
History of Present Illness:  HPI: 89F h/o CAD s/p stents (2019), AF, HLD, GERD, PUD s/p bleed (plavix d/c'd), R eye blindness, UTI was brought in by ambulance today complaining of generalized weakness, upper chest pain and upper back pain which has been worsening since she had a syncopal episode and fall on Friday night. She describes feeling suddenly dizzy and blacked out and her daughter caught her and lowered her to the ground. Per the daughter, the patient has had worsening dementia and declining functional status recently, requiring a walker for ambulation.     Past Medical History  Hyperlipidemia    Hypertension        Past Surgical History  History of left hip replacement    H/O abdominal hysterectomy        MEDICATIONS  (STANDING):  metoprolol tartrate 25 milliGRAM(s) Oral two times a day  pantoprazole    Tablet 40 milliGRAM(s) Oral before breakfast    MEDICATIONS  (PRN):      Allergies: gabapentin (Rash)  Macrobid (Unknown)      SOCIAL HISTORY:  Smoker: [ ] Yes  [ x] No        PACK YEARS:                         WHEN QUIT?  ETOH use: [ ] Yes  [x ] No              FREQUENCY / QUANTITY:  Ilicit Drug use:  [ ] Yes  [x ] No  Occupation:  Live with: daughter  Assist device use: cane or rolling walker    PMD: unk  Referring Cardiologist: Robert    Relevant Family History  FAMILY HISTORY: unk      Review of Systems  GENERAL:  Fevers[] chills[] sweats[] fatigue[] weight loss[] weight gain []                weakness                      [ ] NEGATIVE  NEURO:  parathesias[] seizures []  syncope [x]  confusion []                                                                [ ] NEGATIVE                 EYES: glasses[]  blurry vision[]  discharge[] pain[] glaucoma []          R eye blindness                                                 [ ] NEGATIVE                 ENMT:  difficulty hearing []  vertigo[]  dysphagia[] epistaxis[] recent dental work []                     [x ] NEGATIVE                 CV:  chest pain[x upper chest] palpitations[] MAGANA [] diaphoresis [] edema[]             also upper back pain                                              [ ] NEGATIVE                                 RESPIRATORY:  wheezing[] SOB[] cough [] sputum[] hemoptysis[]                                                   [ ] NEGATIVE               GI:  nausea[]  vomiting []  diarrhea[] constipation [] melena []                                                          [x ] NEGATIVE            : hematuria[ ]  dysuria[ ] urgency[] incontinence[]                                                                          [x ] NEGATIVE                    MUSKULOSKELETAL:  arthritis[ ]  joint swelling [ ] muscle weakness [x ]                                            [x ] NEGATIVE                     SKIN/BREAST:  rash[ ] itching [ ]  hair loss[ ] masses[ ]                                                                          [x ] NEGATIVE                     PSYCH:  dementia [x ] depression [ ] anxiety[ ]                                                                                          [ ] NEGATIVE                        HEME/LYMPH:  bruises easily[ ] enlarged lymph nodes[ ] tender lymph nodes[ ]                           [ x] NEGATIVE                      ENDOCRINE:  cold intolerance[ ] heat intolerance[ ] polydipsia[ ]                                                      [ x] NEGATIVE                        Telemetry: -120    PHYSICAL EXAM  Vital Signs Last 24 Hrs  T(C): 37.8 (10 Jarrell 2021 15:13), Max: 37.8 (10 Jarrell 2021 15:13)  T(F): 100.1 (10 Jarrell 2021 15:13), Max: 100.1 (10 Jarrell 2021 15:13)  HR: 96 (10 Jarrell 2021 17:30) (96 - 128)  BP: 122/82 (10 Jarrell 2021 17:30) (118/88 - 135/66)  BP(mean): --  RR: 20 (10 Jarrell 2021 17:30) (18 - 28)  SpO2: 96% (10 Jarrell 2021 17:30) (92% - 98%)    General: Well nourished, well developed, no acute distress.                                                         Neuro: Normal exam oriented to person/place & time with no focal motor or sensory  deficits.                    Eyes: Normal exam of conjunctiva & lids, pupils equally reactive.   ENT: Normal exam of nasal/oral mucosa with absence of cyanosis.   Neck: Normal exam of jugular veins, trachea & thyroid.   Chest: Normal lung exam with good air movement absence of wheezes, rales, or rhonchi:                                                                          CV:  Auscultation: normal [ ] S3[ ] S4[ ] Irregular [x and rapid ] Rub[ ] Clicks[ ]  Murmurs none:[ x]systolic [ ]  diastolic [ ] holosystolic [ ]  Carotids: No Bruits[x ] Other____________ Abdominal Aorta: normal [ ] nonpalpable[ ]                                                                         GI: Normal exam of abdomen, liver & spleen with no noted masses or tenderness.                                                                                              Extremities: Normal no evidence of cyanosis or deformity Edema: none[x ]trace[ ]1+[ ]2+[ ]3+[ ]4+[ ]  Lower Extremity Pulses: Right[+2 ] Left[+1, slightly cooler extremity ]Varicosities[ ]  SKIN : Normal exam to inspection & palpation                                                           LABS:                        12.0   16.10 )-----------( 226      ( 10 Jarrell 2021 12:09 )             36.5     01-10    130<L>  |  97<L>  |  32.0<H>  ----------------------------<  137<H>  3.9   |  21.0<L>  |  0.79    Ca    9.3      10 Jarrell 2021 12:09  Mg     2.2     01-10    TPro  7.0  /  Alb  4.0  /  TBili  1.2  /  DBili  x   /  AST  27  /  ALT  17  /  AlkPhos  49  01-10    PT/INR - ( 10 Jarrell 2021 12:39 )   PT: 15.5 sec;   INR: 1.36 ratio         PTT - ( 10 Jarrell 2021 12:39 )  PTT:26.1 sec  Urinalysis Basic - ( 10 Jarrell 2021 16:01 )    Color: Yellow / Appearance: Clear / S.020 / pH: x  Gluc: x / Ketone: Trace  / Bili: Negative / Urobili: Negative mg/dL   Blood: x / Protein: 100 mg/dL / Nitrite: Negative   Leuk Esterase: Trace / RBC: 3-5 /HPF / WBC 6-10   Sq Epi: x / Non Sq Epi: Few / Bacteria: Few      CARDIAC MARKERS ( 10 Jarrell 2021 12:09 )  x     / 0.06 ng/mL / x     / x     / x        < from: CT Angio Chest w/ IV Cont (01.10.21 @ 16:06) >    IMPRESSION:  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 Small pericardial effusion measures above fluid density. Aortic rupture not excluded.    Right upper lobe 3 x 12 mm tubular opacity. Three-month follow-up CT recommended    These findings were discussed with Dr. RUT UBRRIS [phone] at 1/10/2021 4:12 PM by Dr. Ziggy Bermudez with read back confirmation.    < end of copied text >

## 2021-01-10 NOTE — ED ADULT NURSE NOTE - CHIEF COMPLAINT QUOTE
Pt BIBA from urgent care for weakness and SOB, pt found to be in rapid afib, unknown history, pt c/o pain to chest when moving which started last night, initial room air O2 84%, tested negative for covid at urgent care

## 2021-01-10 NOTE — ED ADULT NURSE NOTE - OBJECTIVE STATEMENT
pt presents to ed alert and oriented to person and place but not time. unknown patient baseline. NAD. c/o chest pain radiating to back since last night. as per family she had syncopal episode friday and has had chest pain since friday. was in rapid afib at urgent care, no hx of afib. pt lethargic but follows commands and answers questions appropriately.

## 2021-01-10 NOTE — ED ADULT NURSE REASSESSMENT NOTE - NS ED NURSE REASSESS COMMENT FT1
report given to ED CC RNs. pt brought to CC for further management in stable condition.
Pt A&Ox2, forgetful of situation easily redirected, c/o chest pain the radiated to back at this time. Pt resting comfortably, VSS, no signs of distress at this time, CM in place, awaiting bed, safety maintained, call bell in reach.

## 2021-01-10 NOTE — CONSULT NOTE ADULT - SUBJECTIVE AND OBJECTIVE BOX
[facility]                                                               Office:  [address] [address]                                                              Telephone: [phone]. Fax:[phone]                                                                        CARDIOLOGY CONSULTATION NOTE                                                                                             Consult requested by:  Dr. Lizarraga  Reason for Consultation: New Onset Afib  History obtained by: Patient and medical record   obtained: No    Chief complaint:    Patient is a 89y old  Female who presents with a chief complaint of weakness.    HPI:  89yo female w/PMH CAD s/p stent (2019 in [place]), on ASA 81, Plavix DCd d/t GI bleeding ulcer, UTIs (at least 2x/yr).  Patient presents to ED c/o weakness. Per dtr Shani Brice ([phone]) pt had recent UTI in December, Friday night she had a syncopal episode and dtr caught her lowering her to the ground. Since the fall she has been c/o chest and back pain with movement and inspiration along with generalized weakness. In ED found to be in AFib (no known Hx of) w/HR 90-120s. Denies exertional chest pain/pressure, palpitations, irregular and/or rapid heart beat, SOB, MAGANA, dizziness, orthopnea, PND, cough, edema, f/c, n/v/d, hematuria, or hematochezia.     REVIEW OF SYMPTOMS:     CONSTITUTIONAL: No fever, weight loss, or fatigue  ENMT:  No difficulty hearing, tinnitus, vertigo; No sinus or throat pain  NECK: No pain or stiffness  CARDIOVASCULAR: as per HPI  RESPIRATORY: as per HPI  : No dysuria, no hematuria   GI: No dark color stool, no melena, no diarrhea, no constipation, no abdominal pain   NEURO: as per HPI  MUSCULOSKELETAL: as per HPI  PSYCH: No agitation, no anxiety.    ALL OTHER REVIEW OF SYSTEMS ARE NEGATIVE.      PREVIOUS DIAGNOSTIC TESTING  none documented      ALLERGIES: Allergies    gabapentin (Rash)  Macrobid (Unknown)    Intolerances          PAST MEDICAL HISTORY  Hyperlipidemia    Hypertension        PAST SURGICAL HISTORY  History of left hip replacement    H/O abdominal hysterectomy        FAMILY HISTORY:      SOCIAL HISTORY:    CIGARETTES: Denies  ALCOHOL: Denies  DRUGS: Denies      CURRENT MEDICATIONS:  metoprolol tartrate 25 milliGRAM(s) Oral two times a day     acetaminophen   Tablet ..        HOME MEDICATIONS:      Vital Signs Last 24 Hrs  T(C): 37.8 (10 Jarrell 2021 15:13), Max: 37.8 (10 Jarrell 2021 15:13)  T(F): 100.1 (10 Jarrell 2021 15:13), Max: 100.1 (10 Jarrell 2021 15:13)  HR: 108 (10 Jarrell 2021 15:13) (107 - 126)  BP: 128/89 (10 Jarrell 2021 15:13) (119/78 - 135/66)  BP(mean): --  RR: 18 (10 Jarrell 2021 15:13) (18 - 28)  SpO2: 94% (10 Jarrell 2021 15:13) (93% - 98%)      PHYSICAL EXAM:  Appearance: NAD, well nourished	  Neurologic: A&Ox3, PERRL, EOMI, Grossly non-focal with full strength in all four extremities  HEENT:   Normal oral mucosa, sclera non-icteric	  Lymphatic: No cervical lymphadenopathy  Cardiovascular: Normal S1 S2, No JVD, No cardiac murmurs, No carotid bruits  Respiratory: Lungs clear to auscultation	  Psychiatry: Mood & affect appropriate  Gastrointestinal:  Soft, Non-tender, + BS, no bruits	  Extremities: Normal range of motion, No clubbing, cyanosis. trace b/l LE edema  Vascular: Peripheral pulses palpable 2+ bilaterally  Skin: No rashes, No Erythema, No ecchymoses, No cyanosis  Lines and Tubes: n/a    Intake and output:     LABS:                        12.0   16.10 )-----------( 226      ( 10 Jarrell 2021 12:09 )             36.5     01-10    130<L>  |  97<L>  |  32.0<H>  ----------------------------<  137<H>  3.9   |  21.0<L>  |  0.79    Ca    9.3      10 Jarrell 2021 12:09  Mg     2.2     01-10    TPro  7.0  /  Alb  4.0  /  TBili  1.2  /  DBili  x   /  AST  27  /  ALT  17  /  AlkPhos  49  01-10    CARDIAC MARKERS ( 10 Jarrell 2021 12:09 )  x     / 0.06 ng/mL / x     / x     / x        ;p-BNP=Serum Pro-Brain Natriuretic Peptide: 3234 pg/mL (01-10 @ 12:09)    PT/INR - ( 10 Jarrell 2021 12:39 )   PT: 15.5 sec;   INR: 1.36 ratio         PTT - ( 10 Jarrell 2021 12:39 )  PTT:26.1 sec      INTERPRETATION OF TELEMETRY: Reviewed by me.   ECG: Reviewed by me.     RADIOLOGY & ADDITIONAL STUDIES:    X-ray:  reviewed by me.     CT scan:   MRI:

## 2021-01-10 NOTE — CONSULT NOTE ADULT - SUBJECTIVE AND OBJECTIVE BOX
89y  Female  gabapentin (Rash)  Macrobid (Unknown)    CC:  Patient is a 89y old  Female who presents with a chief complaint of chest pain radiating to back     HPI: 89 year old female with CAD s/p stents (2019), AF, HLD, GERD, PUD s/p bleed (plavix d/c'd presented with generalized weakness, upper chest pain and upper back pain. These symptomas had incresed after a recent  syncopal episode and fall 2 nights ago . The patient has had worsening dementia and declining functional status recently aslo needing a walker for ambulation.     Ct surgery has seen the patient and Patient will not be a candidate for surgery, the family does not want aggressive surgical intervention. Ct service recommended  stat echo, cardiology followup and MICU admission for blood pressure and heart rate control.  on my exam I found her seeping comfortably when awoke not complaining of chest pain and hemodynamically stable at this time. The heart rate is in the high 80's and blood pressure was 112/68 with a trend over past 4-5 systolic pressures in the 110 to 120 range .        PAST MEDICAL & SURGICAL HISTORY:  Hyperlipidemia  Hypertension  History of left hip replacement  H/O abdominal hysterectomy    Vital Signs Last 24 Hrs  T(C): 37.8 (10 Jarrell 2021 15:13), Max: 37.8 (10 Jarrell 2021 15:13)  T(F): 100.1 (10 Jarrell 2021 15:13), Max: 100.1 (10 Jarrell 2021 15:13)  HR: 88 (10 Jarrell 2021 21:31) (86 - 128)  BP: 110/75 (10 Jarrell 2021 20:07) (110/75 - 135/66)  BP(mean): --  RR: 16 (10 Jarrell 2021 21:31) (16 - 28)  SpO2: 95% (10 Jarrell 2021 21:31) (92% - 98%)    I&O's Summary    LABS  01-10    130<L>  |  97<L>  |  32.0<H>  ----------------------------<  137<H>  3.9   |  21.0<L>  |  0.79  Ca    9.3      10 Jarrell 2021 12:09  Mg     2.2     01-10  TPro  7.0  /  Alb  4.0  /  TBili  1.2  /  DBili  x   /  AST  27  /  ALT  17  /  AlkPhos  49  -10                        12.0   16.10 )-----------( 226      ( 10 Jarrell 2021 12:09 )             36.5     PT/INR - ( 10 Jarrell 2021 12:39 )   PT: 15.5 sec;   INR: 1.36 ratio     PTT - ( 10 Jarrell 2021 12:39 )  PTT:26.1 sec  CARDIAC MARKERS ( 10 Jarrell 2021 12:09 )  x     / 0.06 ng/mL / x     / x     / x          LIVER FUNCTIONS - ( 10 Jarrell 2021 12:09 )  Alb: 4.0 g/dL / Pro: 7.0 g/dL / ALK PHOS: 49 U/L / ALT: 17 U/L / AST: 27 U/L / GGT: x           Urinalysis Basic - ( 10 Jarrell 2021 16:01 )  Color: Yellow / Appearance: Clear / S.020 / pH: x  Gluc: x / Ketone: Trace  / Bili: Negative / Urobili: Negative mg/dL   Blood: x / Protein: 100 mg/dL / Nitrite: Negative   Leuk Esterase: Trace / RBC: 3-5 /HPF / WBC 6-10   Sq Epi: x / Non Sq Epi: Few / Bacteria: Few    MEDICATIONS  (STANDING):  metoprolol tartrate 25 milliGRAM(s) Oral two times a day  pantoprazole    Tablet 40 milliGRAM(s) Oral before breakfast      REVIEW OF SYSTEMS:  CONSTITUTIONAL: No fever, weight loss, or fatigue  EYES: No eye pain, visual disturbances, or discharge  ENMT:  No difficulty hearing, tinnitus, vertigo; No sinus or throat pain  NECK: No pain or stiffness  BREASTS: No pain, masses, or nipple discharge  RESPIRATORY: No cough, wheezing, chills or hemoptysis; No shortness of breath  CARDIOVASCULAR: No chest pain, palpitations, dizziness, or leg swelling  GASTROINTESTINAL: No abdominal or epigastric pain. No nausea, vomiting, or hematemesis; No diarrhea or constipation. No melena or hematochezia.  GENITOURINARY: No dysuria, frequency, hematuria, or incontinence  NEUROLOGICAL: No headaches, memory loss, loss of strength, numbness, or tremors  SKIN: No itching, burning, rashes, or lesions   LYMPH NODES: No enlarged glands  ENDOCRINE: No heat or cold intolerance; No hair loss  MUSCULOSKELETAL: No joint pain or swelling; No muscle, back, or extremity pain  PSYCHIATRIC: No depression, anxiety, mood swings, or difficulty sleeping  HEME/LYMPH: No easy bruising, or bleeding gums  ALLERY AND IMMUNOLOGIC: No hives or eczema      Physicial Exam:   Constitutional: NAD, well-groomed, well-developed  HEENT: PERRLA, EOMI, no drainage or redness  Neck: No bruits; no thyromegaly or nodules,  No JVD  Back: Normal spine flexure, No CVA tenderness, No deformity or limitation of movement  Respiratory: Breath Sounds equal & clear to percussion & auscultation, no accessory muscle use  Cardiovascular: Regular rate & rhythm, normal S1, S2; no murmurs, gallops or rubs; no S3, S4  Gastrointestinal: Soft, non-tender, non distended no hepatosplenomegaly, normal bowel sounds  Extremities: No peripheral edema, No cyanosis, clubbing   Vascular: Equal and normal pulses: 2+ peripheral pulses throughout  Neurological: GCS:    A&O x 3; no sensory, motor  deficits, normal reflexes  Psychiatric: Normal mood, normal affect  Musculoskeletal: No joint pain, swelling or deformity; no limitation of movement  Skin: No rashes             89y  Female  gabapentin (Rash)  Macrobid (Unknown)    CC:  Patient is a 89y old  Female who presents with a chief complaint of chest pain radiating to back     HPI: 89 year old female with CAD s/p stents (2019), AF, HLD, GERD, PUD s/p bleed (plavix d/c'd presented with generalized weakness, upper chest pain and upper back pain. These symptomas had incresed after a recent  syncopal episode and fall 2 nights ago . The patient has had worsening dementia and declining functional status recently aslo needing a walker for ambulation.     CTA of the chest found ascending aorta with peripheral intramural hematoma/dissection extending proximally to the level of the right coronary artery and distally to involve the aortic arch and great vessels and concern for  Aortic rupture not excluded    Ct surgery has seen the patient and Patient will not be a candidate for surgery, the family does not want aggressive surgical intervention. Ct service recommended  stat echo, cardiology followup and MICU admission for blood pressure and heart rate control.  on my exam I found her seeping comfortably when awoke not complaining of chest pain and hemodynamically stable at this time. The heart rate is in the high 80's and blood pressure was 112/68 with a trend over past 4-5 systolic pressures in the 110 to 120 range .              PAST MEDICAL & SURGICAL HISTORY:  Hyperlipidemia  Hypertension  History of left hip replacement  H/O abdominal hysterectomy    Vital Signs Last 24 Hrs  T(C): 37.8 (10 Jarrell 2021 15:13), Max: 37.8 (10 Jarrell 2021 15:13)  T(F): 100.1 (10 Jarrell 2021 15:13), Max: 100.1 (10 Jarrell 2021 15:13)  HR: 88 (10 Jarrell 2021 21:31) (86 - 128)  BP: 110/75 (10 Jarrell 2021 20:07) (110/75 - 135/66)  BP(mean): --  RR: 16 (10 Jarrell 2021 21:31) (16 - 28)  SpO2: 95% (10 Jarrell 2021 21:31) (92% - 98%)    I&O's Summary    LABS  01-10    130<L>  |  97<L>  |  32.0<H>  ----------------------------<  137<H>  3.9   |  21.0<L>  |  0.79  Ca    9.3      10 Jarrell 2021 12:09  Mg     2.2     -10  TPro  7.0  /  Alb  4.0  /  TBili  1.2  /  DBili  x   /  AST  27  /  ALT  17  /  AlkPhos  49  -10                        12.0   16.10 )-----------( 226      ( 10 Jarrell 2021 12:09 )             36.5     PT/INR - ( 10 Jarrell 2021 12:39 )   PT: 15.5 sec;   INR: 1.36 ratio     PTT - ( 10 Jarrell 2021 12:39 )  PTT:26.1 sec  CARDIAC MARKERS ( 10 Jarrell 2021 12:09 )  x     / 0.06 ng/mL / x     / x     / x          LIVER FUNCTIONS - ( 10 Jarrell 2021 12:09 )  Alb: 4.0 g/dL / Pro: 7.0 g/dL / ALK PHOS: 49 U/L / ALT: 17 U/L / AST: 27 U/L / GGT: x           Urinalysis Basic - ( 10 Jarrell 2021 16:01 )  Color: Yellow / Appearance: Clear / S.020 / pH: x  Gluc: x / Ketone: Trace  / Bili: Negative / Urobili: Negative mg/dL   Blood: x / Protein: 100 mg/dL / Nitrite: Negative   Leuk Esterase: Trace / RBC: 3-5 /HPF / WBC 6-10   Sq Epi: x / Non Sq Epi: Few / Bacteria: Few    MEDICATIONS  (STANDING):  metoprolol tartrate 25 milliGRAM(s) Oral two times a day  pantoprazole    Tablet 40 milliGRAM(s) Oral before breakfast      REVIEW OF SYSTEMS:  CONSTITUTIONAL: No fever, weight loss, or fatigue  EYES: No eye pain, visual disturbances, or discharge  ENMT:  No difficulty hearing, tinnitus, vertigo; No sinus or throat pain  NECK: No pain or stiffness  BREASTS: No pain, masses, or nipple discharge  RESPIRATORY: No cough, wheezing, chills or hemoptysis; No shortness of breath  CARDIOVASCULAR: No chest pain, palpitations, dizziness, or leg swelling  GASTROINTESTINAL: No abdominal or epigastric pain. No nausea, vomiting, or hematemesis; No diarrhea or constipation. No melena or hematochezia.  GENITOURINARY: No dysuria, frequency, hematuria, or incontinence  NEUROLOGICAL: No headaches, memory loss, loss of strength, numbness, or tremors  SKIN: No itching, burning, rashes, or lesions   LYMPH NODES: No enlarged glands  ENDOCRINE: No heat or cold intolerance; No hair loss  MUSCULOSKELETAL: No joint pain or swelling; No muscle, back, or extremity pain  PSYCHIATRIC: No depression, anxiety, mood swings, or difficulty sleeping  HEME/LYMPH: No easy bruising, or bleeding gums  ALLERY AND IMMUNOLOGIC: No hives or eczema      Physicial Exam:   Constitutional: NAD, well-groomed, well-developed  HEENT: PERRLA, EOMI, no drainage or redness  Neck: No bruits; no thyromegaly or nodules,  No JVD  Back: Normal spine flexure, No CVA tenderness, No deformity or limitation of movement  Respiratory: Breath Sounds equal & clear to percussion & auscultation, no accessory muscle use  Cardiovascular: Regular rate & rhythm, normal S1, S2; no murmurs, gallops or rubs; no S3, S4  Gastrointestinal: Soft, non-tender, non distended no hepatosplenomegaly, normal bowel sounds  Extremities: No peripheral edema, No cyanosis, clubbing   Vascular: Equal and normal pulses: 2+ peripheral pulses throughout  Neurological: GCS:    A&O x 3; no sensory, motor  deficits, normal reflexes  Psychiatric: Normal mood, normal affect  Musculoskeletal: No joint pain, swelling or deformity; no limitation of movement  Skin: No rashes             89y  Female  gabapentin (Rash)  Macrobid (Unknown)    CC:  Patient is a 89y old  Female who presents with a chief complaint of chest pain radiating to back     HPI: 89 year old female with CAD s/p stents (2019), AF, HLD, GERD, PUD s/p bleed (plavix d/c'd presented with generalized weakness, upper chest pain and upper back pain. These symptomas had incresed after a recent  syncopal episode and fall 2 nights ago . The patient has had worsening dementia and declining functional status recently aslo needing a walker for ambulation. In the work up of combined chest/back pain  CTA of the chest found ascending aorta with peripheral intramural hematoma/dissection extending proximally to the level of the right coronary artery and distally to involve the aortic arch and great vessels and concern for rupture.     Ct surgery has been consulted and Patient will not be a candidate for surgery, the family does not want aggressive surgical intervention. Ct service recommended stat echo, cardiology followup and MICU admission for blood pressure and heart rate control.  on my exam I found her seeping comfortably when awoke not complaining of chest pain and hemodynamically stable at this time. The heart rate is in the high 80's and blood pressure was 112/68 with a trend over past 4-5 systolic pressures in the 110 to 120 range .              PAST MEDICAL & SURGICAL HISTORY:  Hyperlipidemia  Hypertension  History of left hip replacement  H/O abdominal hysterectomy    Vital Signs Last 24 Hrs  T(C): 37.8 (10 Jarrell 2021 15:13), Max: 37.8 (10 Jarrell 2021 15:13)  T(F): 100.1 (10 Jarrell 2021 15:13), Max: 100.1 (10 Jarrell 2021 15:13)  HR: 88 (10 Jarrell 2021 21:31) (86 - 128)  BP: 110/75 (10 Jarrell 2021 20:07) (110/75 - 135/66)  BP(mean): --  RR: 16 (10 Jarrell 2021 21:31) (16 - 28)  SpO2: 95% (10 Jarrell 2021 21:31) (92% - 98%)    I&O's Summary    LABS  01-10    130<L>  |  97<L>  |  32.0<H>  ----------------------------<  137<H>  3.9   |  21.0<L>  |  0.79  Ca    9.3      10 Jarrell 2021 12:09  Mg     2.2     -10  TPro  7.0  /  Alb  4.0  /  TBili  1.2  /  DBili  x   /  AST  27  /  ALT  17  /  AlkPhos  49  10                        12.0   16.10 )-----------( 226      ( 10 Jarrell 2021 12:09 )             36.5     PT/INR - ( 10 Jarrell 2021 12:39 )   PT: 15.5 sec;   INR: 1.36 ratio     PTT - ( 10 Jarrell 2021 12:39 )  PTT:26.1 sec  CARDIAC MARKERS ( 10 Jarrell 2021 12:09 )  x     / 0.06 ng/mL / x     / x     / x          LIVER FUNCTIONS - ( 10 Jarrell 2021 12:09 )  Alb: 4.0 g/dL / Pro: 7.0 g/dL / ALK PHOS: 49 U/L / ALT: 17 U/L / AST: 27 U/L / GGT: x           Urinalysis Basic - ( 10 Jarrell 2021 16:01 )  Color: Yellow / Appearance: Clear / S.020 / pH: x  Gluc: x / Ketone: Trace  / Bili: Negative / Urobili: Negative mg/dL   Blood: x / Protein: 100 mg/dL / Nitrite: Negative   Leuk Esterase: Trace / RBC: 3-5 /HPF / WBC 6-10   Sq Epi: x / Non Sq Epi: Few / Bacteria: Few    MEDICATIONS  (STANDING):  metoprolol tartrate 25 milliGRAM(s) Oral two times a day  pantoprazole    Tablet 40 milliGRAM(s) Oral before breakfast      REVIEW OF SYSTEMS:  CONSTITUTIONAL: No fever, weight loss, or fatigue  EYES: No eye pain, visual disturbances, or discharge  ENMT:  No difficulty hearing, tinnitus, vertigo; No sinus or throat pain  NECK: No pain or stiffness  BREASTS: No pain, masses, or nipple discharge  RESPIRATORY: No cough, wheezing, chills or hemoptysis; No shortness of breath  CARDIOVASCULAR: No chest pain, palpitations, dizziness, or leg swelling  GASTROINTESTINAL: No abdominal or epigastric pain. No nausea, vomiting, or hematemesis; No diarrhea or constipation. No melena or hematochezia.  GENITOURINARY: No dysuria, frequency, hematuria, or incontinence  NEUROLOGICAL: No headaches, memory loss, loss of strength, numbness, or tremors  SKIN: No itching, burning, rashes, or lesions   LYMPH NODES: No enlarged glands  ENDOCRINE: No heat or cold intolerance; No hair loss  MUSCULOSKELETAL: No joint pain or swelling; No muscle, back, or extremity pain  PSYCHIATRIC: No depression, anxiety, mood swings, or difficulty sleeping  HEME/LYMPH: No easy bruising, or bleeding gums  ALLERY AND IMMUNOLOGIC: No hives or eczema      Physicial Exam:   Constitutional: NAD, well-groomed, well-developed  HEENT: PERRLA, EOMI, no drainage or redness  Neck: No bruits; no thyromegaly or nodules,  No JVD  Back: Normal spine flexure, No CVA tenderness, No deformity or limitation of movement  Respiratory: Breath Sounds equal & clear to percussion & auscultation, no accessory muscle use  Cardiovascular: Regular rate & rhythm, normal S1, S2; no murmurs, gallops or rubs; no S3, S4  Gastrointestinal: Soft, non-tender, non distended no hepatosplenomegaly, normal bowel sounds  Extremities: No peripheral edema, No cyanosis, clubbing   Vascular: Equal and normal pulses: 2+ peripheral pulses throughout  Neurological: GCS:    A&O x 3; no sensory, motor  deficits, normal reflexes  Psychiatric: Normal mood, normal affect  Musculoskeletal: No joint pain, swelling or deformity; no limitation of movement  Skin: No rashes             89y  Female  gabapentin (Rash)  Macrobid (Unknown)    CC:  Patient is a 89y old  Female who presents with a chief complaint of chest pain radiating to back     HPI: 89 year old female with CAD s/p stents (2019), AF, HLD, GERD, PUD s/p bleed (plavix d/c'd presented with generalized weakness, upper chest pain and upper back pain. These symptomas had incresed after a recent  syncopal episode and fall 2 nights ago . The patient has had worsening dementia and declining functional status recently aslo needing a walker for ambulation. In the work up of combined chest/back pain  CTA of the chest found ascending aorta with peripheral intramural hematoma/dissection extending proximally to the level of the right coronary artery and distally to involve the aortic arch and great vessels and concern for rupture.     Ct surgery has been consulted and Patient will not be a candidate for surgery, the family does not want aggressive surgical intervention. Ct service recommended stat echo, cardiology followup and MICU admission for blood pressure and heart rate control.  on my exam I found her seeping comfortably when awoke not complaining of chest pain and hemodynamically stable at this time. The heart rate is in the high 80's and blood pressure was 112/68 with a trend over past 4-5 systolic pressures in the 110 to 120 range .     PAST MEDICAL & SURGICAL HISTORY:  Hyperlipidemia  Hypertension  History of left hip replacement  H/O abdominal hysterectomy    Vital Signs Last 24 Hrs  T(C): 37.8 (10 Jarrell 2021 15:13), Max: 37.8 (10 Jarrell 2021 15:13)  T(F): 100.1 (10 Jarrell 2021 15:13), Max: 100.1 (10 Jarrell 2021 15:13)  HR: 88 (10 Jarrell 2021 21:31) (86 - 128)  BP: 110/75 (10 Jarrell 2021 20:07) (110/75 - 135/66)  BP(mean): --  RR: 16 (10 Jarrell 2021 21:31) (16 - 28)  SpO2: 95% (10 Jarrell 2021 21:31) (92% - 98%)    I&O's Summary    LABS  01-10    130<L>  |  97<L>  |  32.0<H>  ----------------------------<  137<H>  3.9   |  21.0<L>  |  0.79  Ca    9.3      10 Jarrell 2021 12:09  Mg     2.2     -10  TPro  7.0  /  Alb  4.0  /  TBili  1.2  /  DBili  x   /  AST  27  /  ALT  17  /  AlkPhos  49  10                        12.0   16.10 )-----------( 226      ( 10 Jarrell 2021 12:09 )             36.5     PT/INR - ( 10 Jarrell 2021 12:39 )   PT: 15.5 sec;   INR: 1.36 ratio     PTT - ( 10 Jarrell 2021 12:39 )  PTT:26.1 sec  CARDIAC MARKERS ( 10 Jarrell 2021 12:09 )  x     / 0.06 ng/mL / x     / x     / x          LIVER FUNCTIONS - ( 10 Jarrell 2021 12:09 )  Alb: 4.0 g/dL / Pro: 7.0 g/dL / ALK PHOS: 49 U/L / ALT: 17 U/L / AST: 27 U/L / GGT: x           Urinalysis Basic - ( 10 Jarrell 2021 16:01 )  Color: Yellow / Appearance: Clear / S.020 / pH: x  Gluc: x / Ketone: Trace  / Bili: Negative / Urobili: Negative mg/dL   Blood: x / Protein: 100 mg/dL / Nitrite: Negative   Leuk Esterase: Trace / RBC: 3-5 /HPF / WBC 6-10   Sq Epi: x / Non Sq Epi: Few / Bacteria: Few    MEDICATIONS  (STANDING):  metoprolol tartrate 25 milliGRAM(s) Oral two times a day  pantoprazole    Tablet 40 milliGRAM(s) Oral before breakfast      REVIEW OF SYSTEMS:  CONSTITUTIONAL: No fever, weight loss, or fatigue  EYES: No eye pain, visual disturbances, or discharge  ENMT:  No difficulty hearing, tinnitus, vertigo; No sinus or throat pain  NECK: No pain or stiffness  BREASTS: No pain, masses, or nipple discharge  RESPIRATORY: No cough, wheezing, chills or hemoptysis; No shortness of breath  CARDIOVASCULAR: No chest pain, palpitations, dizziness, or leg swelling  GASTROINTESTINAL: No abdominal or epigastric pain. No nausea, vomiting, or hematemesis; No diarrhea or constipation. No melena or hematochezia.  GENITOURINARY: No dysuria, frequency, hematuria, or incontinence  NEUROLOGICAL: No headaches, memory loss, loss of strength, numbness, or tremors  SKIN: No itching, burning, rashes, or lesions   LYMPH NODES: No enlarged glands  ENDOCRINE: No heat or cold intolerance; No hair loss  MUSCULOSKELETAL: No joint pain or swelling; No muscle, back, or extremity pain  PSYCHIATRIC: No depression, anxiety, mood swings, or difficulty sleeping  HEME/LYMPH: No easy bruising, or bleeding gums  ALLERY AND IMMUNOLOGIC: No hives or eczema      Physicial Exam:   Constitutional: NAD, well-groomed, well-developed  HEENT: PERRLA, EOMI, no drainage or redness  Neck:   No JVD  Respiratory: Breath Sounds equal & clear to percussion & auscultation, no accessory muscle use  Cardiovascular: Regular rate & rhythm, normal S1, S2; no murmurs, gallops or rubs; no S3, S4  Gastrointestinal: Soft, non-tender, non distended no hepatosplenomegaly, normal bowel sounds  Extremities: No peripheral edema, No cyanosis, clubbing   Vascular: Equal and normal pulses: 2+ peripheral pulses throughout  Neurological:  no sensory, motor  deficits, normal reflexes  Musculoskeletal: No joint pain, swelling or deformity; no limitation of movement  Skin: No rashes

## 2021-01-11 NOTE — PROGRESS NOTE ADULT - SUBJECTIVE AND OBJECTIVE BOX
[place] [facility]                                                       [facility]                                                               Office: [address] [address]                                                              Telephone: [phone]. Fax:[phone]                                                                             PROGRESS NOTE  Reason for follow up: Ascending Aortic Dissection, New Onset AFIB RVR, Small Pericardial Effusion  Update: 1/11: Patient resting comfortably on 2L NC O2sat @98%. c/t report chest discomfort and back pain w/inspiration and movement. CM now in SR.      Review of symptoms:   All review of systems negative unless indicated otherwise above.     	  Vitals:  T(C): 36.3 (01-11-21 @ 05:45), Max: 37.8 (01-10-21 @ 15:13)  HR: 85 (01-11-21 @ 08:00) (85 - 128)  BP: 112/66 (01-11-21 @ 08:00) (106/40 - 135/66)  RR: 18 (01-11-21 @ 08:00) (16 - 28)  SpO2: 98% (01-11-21 @ 08:00) (92% - 98%)  Wt(kg): --  I&O's Summary    Weight (kg): 52.2 (01-10 @ 11:40)      PHYSICAL EXAM:  Appearance: NAD, well nourished	  Neurologic: A&Ox3, PERRL, EOMI, Grossly non-focal with full strength in all four extremities  HEENT:   Normal oral mucosa, sclera non-icteric	  Lymphatic: No cervical lymphadenopathy  Cardiovascular: Normal S1 S2, No JVD, No cardiac murmurs, No carotid bruits  Respiratory: Lungs clear to auscultation	  Psychiatry: Mood & affect appropriate  Gastrointestinal:  Soft, Non-tender, + BS, no bruits	  Extremities: Normal range of motion, No clubbing, cyanosis or edema  Vascular: Peripheral pulses palpable 2+ bilaterally  Skin: No rashes, No Erythema, No ecchymoses, No cyanosis  Lines and Tubes: n/a      CURRENT MEDICATIONS:  metoprolol tartrate 25 milliGRAM(s) Oral two times a day  metoprolol tartrate Injectable 5 milliGRAM(s) IV Push every 6 hours PRN    cefTRIAXone   IVPB  pantoprazole    Tablet      DIAGNOSTIC TESTING:  [ ] Echocardiogram:   [ ]  Catheterization:  [ ] Stress Test:    OTHER: 	      LABS:	 	  CARDIAC MARKERS ( 11 Jan 2021 01:53 )  x     / 0.07 ng/mL / 32 U/L / x     / x      p-BNP 11 Jan 2021 01:53: x    , CARDIAC MARKERS ( 10 Jarrell 2021 12:09 )  x     / 0.06 ng/mL / x     / x     / x      p-BNP 10 Jarrell 2021 12:09: 3234 pg/mL                          11.7   14.77 )-----------( 227      ( 11 Jan 2021 09:14 )             35.9     01-10    130<L>  |  97<L>  |  32.0<H>  ----------------------------<  137<H>  3.9   |  21.0<L>  |  0.79    Ca    9.3      10 Jarrell 2021 12:09  Mg     2.2     01-10    TPro  7.0  /  Alb  4.0  /  TBili  1.2  /  DBili  x   /  AST  27  /  ALT  17  /  AlkPhos  49  01-10    proBNP: Serum Pro-Brain Natriuretic Peptide: 3234 pg/mL (01-10 @ 12:09)    Lipid Profile:   HgA1c:   TSH: Thyroid Stimulating Hormone, Serum: <0.10 uIU/mL        TELEMETRY: Reviewed    ECG:  Reviewed by me. 	                                                                      [place] [facility]                                                       [facility]                                                               Office: [address] [address]                                                              Telephone: [phone]. Fax:[phone]                                                                             PROGRESS NOTE  Reason for follow up: Ascending Aortic Dissection, New Onset AFIB RVR, Small Pericardial Effusion  Update: 1/11: Patient resting comfortably on 2L NC O2sat @98%. c/t report chest discomfort and back pain w/inspiration and movement. CM now in SR.      Review of symptoms:   All review of systems negative unless indicated otherwise above.     	  Vitals:  T(C): 36.3 (01-11-21 @ 05:45), Max: 37.8 (01-10-21 @ 15:13)  HR: 85 (01-11-21 @ 08:00) (85 - 128)  BP: 112/66 (01-11-21 @ 08:00) (106/40 - 135/66)  RR: 18 (01-11-21 @ 08:00) (16 - 28)  SpO2: 98% (01-11-21 @ 08:00) (92% - 98%)  Wt(kg): --  I&O's Summary    Weight (kg): 52.2 (01-10 @ 11:40)      PHYSICAL EXAM:  Appearance: NAD, well nourished	  Neurologic: A&Ox3, PERRL, EOMI, Grossly non-focal with full strength in all four extremities  HEENT:   Normal oral mucosa, sclera non-icteric	  Lymphatic: No cervical lymphadenopathy  Cardiovascular: Normal S1 S2, No JVD, No cardiac murmurs, No carotid bruits  Respiratory: Lungs clear to auscultation	  Psychiatry: Mood & affect appropriate  Gastrointestinal:  Soft, Non-tender, + BS, no bruits	  Extremities: Normal range of motion, No clubbing, cyanosis. trace b/l LE edema  Vascular: Peripheral pulses palpable 2+ bilaterally  Skin: No rashes, No Erythema, No ecchymoses, No cyanosis  Lines and Tubes: n/a      CURRENT MEDICATIONS:  metoprolol tartrate 25 milliGRAM(s) Oral two times a day  metoprolol tartrate Injectable 5 milliGRAM(s) IV Push every 6 hours PRN    cefTRIAXone   IVPB  pantoprazole    Tablet      DIAGNOSTIC TESTING:  [ ] Echocardiogram:   < from: TTE Echo Complete w/o Doppler (01.10.21 @ 17:44) >  Summary:   1. Technically limited study.   2. Normal left atrial size.   3. There is mild concentric left ventricular hypertrophy.   4. Hyperdynamic wall motion. Left ventricular ejection fraction, by visual estimation, is >75%.  5. Normal right atrial size.   6. Normal right ventricular size and function.   7. Moderate aortic regurgitation.   8. Moderate tricuspid regurgitation.   9. Small pericardial effusion.  10. Dilatation of the ascending aorta 5.4 cm , transverse aortaand aortic root. There is a linear density noted at the Non coronary cusp. No obvious dissection flap. No prior study to compare. Cannot rule out intramural hematoma and/or concealed dissection. CT chest is more accurate to evaluate for such findings.    MD Alexa, RPVI Electronically signed on 1/10/2021 at 6:51:50 PM    < end of copied text >    [ ]  Catheterization:  [ ] Stress Test:    OTHER: 	  CT:  < from: CT Angio Chest w/ IV Cont (01.10.21 @ 16:06) >  IMPRESSION:  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 Small pericardial effusion measures above fluid density. Aortic rupture not excluded.    Right upper lobe 3 x 12 mm tubular opacity. Three-month follow-up CT recommended    < end of copied text >      LABS:	 	  CARDIAC MARKERS ( 11 Jan 2021 01:53 )  x     / 0.07 ng/mL / 32 U/L / x     / x      p-BNP 11 Jan 2021 01:53: x    , CARDIAC MARKERS ( 10 Jarrell 2021 12:09 )  x     / 0.06 ng/mL / x     / x     / x      p-BNP 10 Jarrell 2021 12:09: 3234 pg/mL                          11.7   14.77 )-----------( 227      ( 11 Jan 2021 09:14 )             35.9     01-10    130<L>  |  97<L>  |  32.0<H>  ----------------------------<  137<H>  3.9   |  21.0<L>  |  0.79    Ca    9.3      10 Jarrell 2021 12:09  Mg     2.2     01-10    TPro  7.0  /  Alb  4.0  /  TBili  1.2  /  DBili  x   /  AST  27  /  ALT  17  /  AlkPhos  49  01-10    proBNP: Serum Pro-Brain Natriuretic Peptide: 3234 pg/mL (01-10 @ 12:09)    Lipid Profile:   HgA1c:   TSH: Thyroid Stimulating Hormone, Serum: <0.10 uIU/mL        TELEMETRY: now SR 80-90s

## 2021-01-11 NOTE — GOALS OF CARE CONVERSATION - ADVANCED CARE PLANNING - CONVERSATION DETAILS
D/W patient- meaning of CPR/ Ventialtion/ DNr/ DNI. Pt opts for DNR/ DNI but wants me to discuss with dgtr too.  D/W dgtr- She agrees with DNr/ DNI

## 2021-01-11 NOTE — PROGRESS NOTE ADULT - ASSESSMENT
ASSESSMENT  89 year old female with Dementia ?, CAD s/p stents (2019), AF, HLD, GERD, PUD s/p bleed (plavix d/c'd) presented with Syncope    here with Ascending Aortic Dissection, New Onset AFIB RVR, Small Pericardial Effusion    # Syncope  likely sec to Afib RVR noted on presentation  COVID19 PCR Negative    # Incidental finding of Ascending Aortic Dissection 6 cm, Intramural Hematoma, Small Pericardial Effusion  -not a surgical candidate per CT SX  -family does not want aggressive surgical intervention with progressive dementia/functional decline per documentation  -currently hemodynamically stable at this time declined by MICU  -Aggressive BP control with BB  -she has pericardial effusion that may be blood.  -Repeat Echo in 2 days to evaluate for worsening pericardial effusion    #Small Pericardial Effusion  -as above     # New Onset AFIB s/p RVR  -likely due to blood in pericardial space. or intramural hematoma an incidental finding  -Reported GIB on Plavix previously.   -No AC at this time due to risk/benefit in setting of Dissection and hx GIB on Plavix  -lopressor 25 mg Q12 now increased to Q8 hrs; Metoprolol 5 mg IV prn for heart rate > 100.  -heart rate goal of <70 atleast.     # Leukocytosis, Acute UTI  -Ceftriaxone 1g IV for dirty UA and Leukocytosis. F/u UCX pending.     # s/p NAGMA  Likely decreased cardiac output 2/2 AFIB RVR and Aortic Dissection causing acidosis.   Resolved    # Incidental Pulm Nodule/Opacity  -Unknown if new. RUL 3x12mm Tubular Opacity on CTA. 3 Month repeat CT follow-up as outpatient.     ICDs    GoC- Pt wants to be DNR/ DNI but wants me to talk to her dgtr also  Prognosis- gaurded. Palliative consulted for possible Hospice involvement   LOS- 1-2 days  Dispo- likely home         ASSESSMENT  89 year old female with Dementia ?, CAD s/p stents (2019), AF, HLD, GERD, PUD s/p bleed (plavix d/c'd) presented with Syncope    here with Ascending Aortic Dissection, New Onset AFIB RVR, Small Pericardial Effusion    # Syncope  likely sec to Afib RVR noted on presentation  COVID19 PCR Negative    # Incidental finding of Ascending Aortic Dissection 6 cm, Intramural Hematoma, Small Pericardial Effusion  -not a surgical candidate per CT SX  -family does not want aggressive surgical intervention with progressive dementia/functional decline per documentation  -currently hemodynamically stable at this time declined by MICU  -Aggressive BP control with BB  -she has pericardial effusion that may be blood.  -Repeat Echo in 2 days to evaluate for worsening pericardial effusion    #Small Pericardial Effusion  -as above     # New Onset AFIB s/p RVR  -likely due to blood in pericardial space. or intramural hematoma an incidental finding  -Reported GIB on Plavix previously.   -No AC at this time due to risk/benefit in setting of Dissection and hx GIB on Plavix  -lopressor 25 mg Q12 now increased to Q8 hrs; Metoprolol 5 mg IV prn for heart rate > 100.  -heart rate goal of <70 atleast.     # NSTEMI  likely sec to demand ischemia from A fib  trend Trops  No antiplatelets, anticoag sec to risk of bleed    # Leukocytosis, Acute UTI  -Ceftriaxone 1g IV for dirty UA and Leukocytosis. F/u UCX pending.     # s/p NAGMA  Likely decreased cardiac output 2/2 AFIB RVR and Aortic Dissection causing acidosis.   Resolved    # Incidental Pulm Nodule/Opacity  -Unknown if new. RUL 3x12mm Tubular Opacity on CTA. 3 Month repeat CT follow-up as outpatient.     ICDs    GoC- Pt wants to be DNR/ DNI but wants me to talk to her dgtr also  Prognosis- gaurded. Palliative consulted for possible Hospice involvement   LOS- 1-2 days  Dispo- likely home  Dgtr called. med list obtained and updated.         ASSESSMENT  89 year old female with Dementia ?, CAD s/p stents (2019), AF, HLD, GERD, PUD s/p bleed (plavix d/c'd), steroid dependent RA presented with Syncope    here with Ascending Aortic Dissection, New Onset AFIB RVR, Small Pericardial Effusion    # Syncope  likely sec to Afib RVR noted on presentation  COVID19 PCR Negative    # Incidental finding of Ascending Aortic Dissection 6 cm, Intramural Hematoma, Small Pericardial Effusion  -not a surgical candidate per CT SX  -family does not want aggressive surgical intervention with progressive dementia/functional decline per documentation  -currently hemodynamically stable at this time declined by MICU  -Aggressive BP control with BB  -she has pericardial effusion that may be blood.  -Repeat Echo in 2 days to evaluate for worsening pericardial effusion    #Small Pericardial Effusion  -as above     # Paroxysmal AFIB s/p RVR  -likely due to blood in pericardial space. or intramural hematoma an incidental finding  -Reported GIB on Plavix previously.   -No AC at this time due to risk/benefit in setting of Dissection and hx GIB on Plavix  -lopressor 25 mg Q12 now increased to Q8 hrs; Metoprolol 5 mg IV prn for heart rate > 100.  -heart rate goal of <70 atleast.   - after confirming home meds- pt is on sotolol 80 bid. restart same here now. and if rate controlled with that dose, then can follow with her regular cardiologist outpt post discharge    # NSTEMI  likely sec to demand ischemia from A fib  trend Trops  No antiplatelets, anticoag sec to risk of bleed    # Leukocytosis, Acute UTI  -Ceftriaxone 1g IV for dirty UA and Leukocytosis. F/u UCX pending.     # s/p NAGMA  Likely decreased cardiac output 2/2 AFIB RVR and Aortic Dissection causing acidosis.   Resolved    # steroid dependent RA  resume Prednisone    # Incidental Pulm Nodule/Opacity  -Unknown if new. RUL 3x12mm Tubular Opacity on CTA. 3 Month repeat CT follow-up as outpatient.     ICDs    GoC- DNR/ DNI  Prognosis- gaurded.   LOS- 1-2 days  Dispo- likely home  Dgtr called. med list obtained and updated.

## 2021-01-11 NOTE — PROGRESS NOTE ADULT - ATTENDING COMMENTS
as per updated info from Dr. john. Patient already has parox afib on sotallol 80 mg Q12.   Dr john confirmed with duaghter and pharmacy.   at this age, patietn has been toelrating sotalolo. we will continue that and defer the outpatient sotalol long term managemnt to his primary cardiologist  rate control and BP control for aortic aneruysym.   Chest pain: tyelinol  patient not candiadte for surgery./

## 2021-01-11 NOTE — PROGRESS NOTE ADULT - ASSESSMENT
Full note to follow 89yo female w/PMH CAD s/p stent (2019 in [place]), on ASA 81, Plavix DCd d/t GI bleeding ulcer, UTIs (at least 2x/yr).  Patient presents to ED c/o weakness. Per dtr Shani Brice ([phone]) pt had recent UTI in December, Friday night she had a syncopal episode and dtr caught her lowering her to the ground. Since the fall she has been c/o chest and back pain with movement and inspiration along with generalized weakness. In ED found to be in AFib (no known Hx of) w/HR 90-120s.     1/11: Patient resting comfortably on 2L NC O2sat @98%. c/t report chest discomfort and back pain w/inspiration and movement. CM now in SR.    New onset AFib  -now SR  -TTE 1/10 w/EF>75%. Dilatation of the ascending aorta 5.4 cm , transverse aortaand aortic root. There is a linear density noted at the Non coronary cusp. No obvious dissection flap. small pericardial effusion  -TSH <0.10  -pAF likely d/t Hyperthyroid vs blood in pericardial space vs intramural hematoma (incidental finding)  -increase to Lopressor 50mg PO BID for goal HR<70  -NO AC d/t aortic enlargement and intra mural hematoma      Pericardial effusion, intra mural hematoma  -CRP 24.24  -ESR pending  -pericardial effusion may be blood  -Repeat TTE 1/12 to eval for worsening pericardial effusion  -not surgical candidate    Hyperthyroid  -TSH <0.10  -endo eval    Assessment and recommendations are final when note is signed by the attending.      89yo female w/PMH CAD s/p stent ([place] in [place]), on ASA 81, Plavix DCd d/t GI bleeding ulcer, UTIs (at least 2x/yr).  Patient presents to ED c/o weakness. Per dtr Shani Brice ([phone]) pt had recent UTI in December, Friday night she had a syncopal episode and dtr caught her lowering her to the ground. Since the fall she has been c/o chest and back pain with movement and inspiration along with generalized weakness. In ED found to be in AFib (no known Hx of) w/HR 90-120s.     1/11: Patient resting comfortably on 2L NC O2sat @98%. c/t report chest discomfort and back pain w/inspiration and movement. CM now in SR.    New onset AFib  -now SR  -TTE 1/10 w/EF>75%. Dilatation of the ascending aorta 5.4 cm , transverse aortaand aortic root. There is a linear density noted at the Non coronary cusp. No obvious dissection flap. small pericardial effusion  -TSH <0.10  -pAF likely d/t Hyperthyroid vs blood in pericardial space vs intramural hematoma (incidental finding)  -increase to Lopressor 25mg PO q8hrs for goal HR<70  -NO AC d/t aortic enlargement and intra mural hematoma      Pericardial effusion, intra mural hematoma  -CRP 24.24  -ESR 38  -pericardial effusion may be blood  -Repeat TTE 1/12 to eval for worsening pericardial effusion  -not surgical candidate    Hyperthyroid  -TSH <0.10  -endo eval    Assessment and recommendations are final when note is signed by the attending.

## 2021-01-11 NOTE — RAPID RESPONSE TEAM SUMMARY - NSDISPOSITIONRRT_GEN_ALL_CORE
Other
Alert and oriented to person, place and time, memory intact, behavior appropriate to situation, PERRL.

## 2021-01-11 NOTE — RAPID RESPONSE TEAM SUMMARY - NSSITUATIONBACKGROUNDRRT_GEN_ALL_CORE
RR was called by bedside nurse. Pt had acute mental status change.  At my arrival pt presented peripheral (extremities x 4) and perioral cyanosis. Minimal response to verbal stimulus.  Pt co abdominal pain and difficulty breathing   Vitals : BP 57/28, HR 80,RR30, SPO2 100 % on 6 LTs nasal cannula, Blood sugar 167  Pt is DNI/DNR MOLST on file  Family was called to inform pt is critical ill. Informed patient could pass away very soon. Family is coming to hospital pt's Daughter confirmed DNI/DNR     Background  89 year old female with Dementia, CAD s/p stents (2019), AF, HLD, GERD, PUD s/p bleed (plavix d/c'd), steroid dependent RA presented with Syncope to ER    Incidental finding in the ED Ascending Aortic Dissection 6 cm, Intramural Hematoma, New Onset AFIB RVR, Small Pericardial Effusion  -not a surgical candidate per CT SX  -family does not want aggressive surgical intervention with progressive dementia/functional decline per documentation

## 2021-01-11 NOTE — PROGRESS NOTE ADULT - REASON FOR ADMISSION
Ascending Aortic Dissection, New Onset AFIB RVR, Small Pericardial Effusion
Ascending Aortic Dissection, New Onset AFIB RVR, Small Pericardial Effusion

## 2021-01-12 NOTE — DISCHARGE NOTE FOR THE EXPIRED PATIENT - HOSPITAL COURSE
01/10/2021: 89 year old female with CAD s/p stents (2019), AF, HLD, GERD, PUD s/p bleed (plavix d/c'd) presented with generalized weakness, upper chest pain and upper back pain. These symptoms had increased after a recent  syncopal episode and fall 2 nights ago . The patient has had worsening dementia and declining functional status recently also needing a walker for ambulation.  In the work up of combined chest/back pain  CTA of the chest found ascending aorta with peripheral intramural hematoma/dissection extending proximally to the level of the right coronary artery and distally to involve the aortic arch and great vessels and concern for rupture. She also has New Onset AFIB RVR and  Pericardial Effusion  Pt evaluated by ER, MICU, CT SX, and Cardiology. No candidate x MICU. Daughter  does not want surgery for her mother at this time, and CTS agreed that the extremely high risk of open surgery would likely outweigh any benefits.     01/11/2021  COVID19 PCR Negative  Continue AFIB s/p RVR  -likely due to blood in pericardial space. or intramural hematoma an incidental finding  -Reported GIB on Plavix previously.   -No AC at this time due to risk/benefit in setting of Dissection and hx GIB on Plavix  NSTEMI  -likely sec to demand ischemia from A fib  - Leukocytosis, Acute UTI  Prognosis- gaurded. as per Dr Vazquez     RR called  Family notified. Pt DNR/DNI. Started comfort care with family at bedside     01/10/2021: 89 year old female with CAD s/p stents (2019), AF, HLD, GERD, PUD s/p bleed (plavix d/c'd) presented with generalized weakness, upper chest pain and upper back pain. These symptoms had increased after a recent  syncopal episode and fall 2 nights ago . The patient has had worsening dementia and declining functional status recently also needing a walker for ambulation.  In the work up of combined chest/back pain  CTA of the chest found ascending aorta with peripheral intramural hematoma/dissection extending proximally to the level of the right coronary artery and distally to involve the aortic arch and great vessels and concern for rupture. She also has New Onset AFIB RVR and  Pericardial Effusion  Pt evaluated by ER, MICU, CT SX, and Cardiology. No candidate x MICU. Daughter  does not want surgery for her mother at this time, and CTS agreed that the extremely high risk of open surgery would likely outweigh any benefits.     2021  COVID19 PCR Negative  Continue AFIB s/p RVR  -likely due to blood in pericardial space. or intramural hematoma an incidental finding  -Reported GIB on Plavix previously.   -No AC at this time due to risk/benefit in setting of Dissection and hx GIB on Plavix  NSTEMI  -likely sec to demand ischemia from A fib  - Leukocytosis, Acute UTI  Prognosis- gaurded. as per Dr Vazquez     RR was called by bedside nurse. Pt had acute mental status change.  At my arrival pt presented peripheral (extremities x 4) and perioral cyanosis. Minimal response to verbal stimulus.  Pt co abdominal pain and difficulty breathing   Vitals : BP 57/28, HR 80,RR30, SPO2 100 % on 6 LTs nasal cannula, Blood sugar 167  Pt is DNI/DNR MOLST on file  Family was called to inform pt is critical ill. Informed patient could pass away very soon. Family is coming to hospital pt's Daughter confirmed DNI/DNR. Started comfort care with family at bedside.  23:40 I did not observe spontaneous breathing or appreciate heat sound on auscultation There was no palpable radial/carotid pulses. The patient did not respond to nail bed stimuli. I examined the patient and there was no pupillary response to light or touch  Patient was pronounced  at 23:40 on 2021. Family at bedside

## 2021-01-21 PROCEDURE — 80053 COMPREHEN METABOLIC PANEL: CPT

## 2021-01-21 PROCEDURE — U0003: CPT

## 2021-01-21 PROCEDURE — 93005 ELECTROCARDIOGRAM TRACING: CPT

## 2021-01-21 PROCEDURE — 86769 SARS-COV-2 COVID-19 ANTIBODY: CPT

## 2021-01-21 PROCEDURE — 86900 BLOOD TYPING SEROLOGIC ABO: CPT

## 2021-01-21 PROCEDURE — 87086 URINE CULTURE/COLONY COUNT: CPT

## 2021-01-21 PROCEDURE — 85730 THROMBOPLASTIN TIME PARTIAL: CPT

## 2021-01-21 PROCEDURE — 85610 PROTHROMBIN TIME: CPT

## 2021-01-21 PROCEDURE — 71045 X-RAY EXAM CHEST 1 VIEW: CPT

## 2021-01-21 PROCEDURE — 96375 TX/PRO/DX INJ NEW DRUG ADDON: CPT | Mod: XU

## 2021-01-21 PROCEDURE — 82550 ASSAY OF CK (CPK): CPT

## 2021-01-21 PROCEDURE — 84484 ASSAY OF TROPONIN QUANT: CPT

## 2021-01-21 PROCEDURE — 83880 ASSAY OF NATRIURETIC PEPTIDE: CPT

## 2021-01-21 PROCEDURE — 36415 COLL VENOUS BLD VENIPUNCTURE: CPT

## 2021-01-21 PROCEDURE — 85025 COMPLETE CBC W/AUTO DIFF WBC: CPT

## 2021-01-21 PROCEDURE — C1889: CPT

## 2021-01-21 PROCEDURE — U0005: CPT

## 2021-01-21 PROCEDURE — 84100 ASSAY OF PHOSPHORUS: CPT

## 2021-01-21 PROCEDURE — 86850 RBC ANTIBODY SCREEN: CPT

## 2021-01-21 PROCEDURE — 85652 RBC SED RATE AUTOMATED: CPT

## 2021-01-21 PROCEDURE — 86140 C-REACTIVE PROTEIN: CPT

## 2021-01-21 PROCEDURE — 93307 TTE W/O DOPPLER COMPLETE: CPT

## 2021-01-21 PROCEDURE — 82962 GLUCOSE BLOOD TEST: CPT

## 2021-01-21 PROCEDURE — 84443 ASSAY THYROID STIM HORMONE: CPT

## 2021-01-21 PROCEDURE — 99291 CRITICAL CARE FIRST HOUR: CPT | Mod: 25

## 2021-01-21 PROCEDURE — 83735 ASSAY OF MAGNESIUM: CPT

## 2021-01-21 PROCEDURE — 86901 BLOOD TYPING SEROLOGIC RH(D): CPT

## 2021-01-21 PROCEDURE — 84145 PROCALCITONIN (PCT): CPT

## 2021-01-21 PROCEDURE — 71275 CT ANGIOGRAPHY CHEST: CPT

## 2021-01-21 PROCEDURE — 96374 THER/PROPH/DIAG INJ IV PUSH: CPT | Mod: XU

## 2021-01-21 PROCEDURE — 81001 URINALYSIS AUTO W/SCOPE: CPT

## 2021-07-07 NOTE — ED ADULT TRIAGE NOTE - RESPIRATORY RATE (BREATHS/MIN)
Subjective:      Patient ID: Jenny Gonzalez is a 32 y.o. female who presents today for:     Chief Complaint   Patient presents with    Menstrual Problem       HPI Pt reports that recently her PMS has been worse than normal lately. She reports that she will get angry for small things that shouldn't upset her. It has always been there, but it hasn't been this bad or lasted this long before. It now will be longer than a week of around her period. She has been on prozac and zoloft in the past. The prozac had bad side effects. She does not remember why she stopped zoloft, but does not recall anything bad. The OCP is working well for pain and bleeding. Past Medical History:   Diagnosis Date    Abnormal Pap smear of cervix     Depression     Hypertension     Mixed hyperlipidemia      No past surgical history on file. Family History   Problem Relation Age of Onset    High Blood Pressure Mother     Breast Cancer Neg Hx      Social History     Socioeconomic History    Marital status: Single     Spouse name: Not on file    Number of children: Not on file    Years of education: Not on file    Highest education level: Not on file   Occupational History    Not on file   Tobacco Use    Smoking status: Never Smoker    Smokeless tobacco: Never Used   Substance and Sexual Activity    Alcohol use: No     Alcohol/week: 0.0 standard drinks    Drug use: No    Sexual activity: Yes     Partners: Male   Other Topics Concern    Not on file   Social History Narrative    Not on file     Social Determinants of Health     Financial Resource Strain: Low Risk     Difficulty of Paying Living Expenses: Not hard at all   Food Insecurity: No Food Insecurity    Worried About [address] in the Last Year: Never true    [address] in the Last Year: Never true   Transportation Needs: No Transportation Needs    Lack of Transportation (Medical): No    Lack of Transportation (Non-Medical):  No   Physical No orders of the defined types were placed in this encounter. Orders Placed This Encounter   Medications    sertraline (ZOLOFT) 50 MG tablet     Sig: Take 0.5 tab po daily x 2 weeks then take 1 tab po daily. Dispense:  30 tablet     Refill:  1       Return in about 1 month (around 8/7/2021). Start medication as prescribed. Discussed that it typically takes about 4-6 weeks to take full effect. Discussed possibility of SI. If any SI pt should stop medication immediately and seek help. Pt was agreeable. Reviewed with the patient: current clinicalstatus, medications, activities and diet. Side effects, adverse effects of the medication prescribedtoday, as well as treatment plan/ rationale and result expectations have been discussedwith the patient who expresses understanding and desires to proceed. Close follow upto evaluate treatment results and for coordination of care. I have reviewedthe patient's medical history in detail and updated the computerized patient record.     Kathi Mckeon, APRN - CNP 18

## 2024-01-22 NOTE — ED ADULT TRIAGE NOTE - IDEAL BODY WEIGHT(KG)
Called patient/left VM about labs    A1c-still elevated, so would add glipizide to metformin    Urine-significant proteinuria, so will refer to nephrology    Rest of labs require no intervention    
52

## 2024-05-20 NOTE — PROGRESS NOTE ADULT - SUBJECTIVE AND OBJECTIVE BOX
HEALTH ISSUES - PROBLEM Dx:  89 year old female with CAD s/p stents (2019), AF, HLD, GERD, PUD s/p bleed (plavix d/c'd     Ascending Aortic Dissection, New Onset AFIB RVR, Small Pericardial Effusion    INTERVAL HPI/ OVERNIGHT EVENTS:    lying bed  c/o dry mouth as she is NPO still  comfortable no acute complaints  explained all her findings in detail  she expressed understanding and repeated back  discussed [facility]- refer to [facility] notes  plan of care discussed    REVIEW OF SYSTEMS:    as above    Vital Signs Last 24 Hrs  T(C): 36.4 (2021 10:00), Max: 37.8 (10 Jarrell 2021 15:13)  T(F): 97.5 (2021 10:00), Max: 100.1 (10 Jarrell 2021 15:13)  HR: 104 (2021 12:05) (85 - 128)  BP: 128/70 (2021 12:05) (106/40 - 132/81)  BP(mean): 55 (2021 05:45) (55 - 101)  RR: 17 (2021 12:05) (16 - 20)  SpO2: 99% (2021 12:05) (92% - 99%)    Tele- A fib ranging from 90s to low 100s    PHYSICAL EXAM-  Constitutional: Well-appearing, NAD, VSS  Head: NC/AT  Eyes: PERRL, EOMI, anicteric sclera, conjunctiva WNL  ENT: Normal Pharynx, MMM, No tonsillar exudate/erythema  Neck: Supple, Non-tender  Chest: Non-tender, no rashes  Cardio: +Tachycardia, s1/s2  Resp: BS CTA bilaterally, no wheezing/rhonchi/rales  Abd: Soft, Non-tender, Non-distended, no rebound/guarding/rigidity  MSK: moving all extremities, no motor weakness, full ROM x4  Ext: palpable distal pulses, good capillary refill, no clubbing/cyanosis/edema, +LORIE in place  Psych: appropriate, cooperative, confused  Neuro: CN II-XII grossly intact, no focal deficits  Skin: Warm/Dry. No rashes.    MEDICATIONS  (STANDING):  cefTRIAXone   IVPB      metoprolol tartrate 25 milliGRAM(s) Oral every 8 hours  pantoprazole    Tablet 40 milliGRAM(s) Oral before breakfast    MEDICATIONS  (PRN):  acetaminophen   Tablet .. 650 milliGRAM(s) Oral every 6 hours PRN Temp greater or equal to 38C (100.4F), Mild Pain (1 - 3)  metoprolol tartrate Injectable 5 milliGRAM(s) IV Push every 6 hours PRN heart rate > 100 bpm  ondansetron Injectable 4 milliGRAM(s) IV Push every 6 hours PRN Nausea      LABS:                        11.7   14.77 )-----------( 227      ( 2021 09:14 )             35.9     01-11    135  |  101  |  29.0<H>  ----------------------------<  126<H>  4.1   |  24.0  |  0.68    Ca    9.2      2021 09:14  Phos  2.7     -11  Mg     2.2     -11    TPro  6.4<L>  /  Alb  3.6  /  TBili  1.2  /  DBili  x   /  AST  21  /  ALT  14  /  AlkPhos  49  01-11    PT/INR - ( 2021 09:14 )   PT: 16.2 sec;   INR: 1.42 ratio         PTT - ( 2021 09:14 )  PTT:25.5 sec  Urinalysis Basic - ( 10 Jarrell 2021 16:01 )    Color: Yellow / Appearance: Clear / S.020 / pH: x  Gluc: x / Ketone: Trace  / Bili: Negative / Urobili: Negative mg/dL   Blood: x / Protein: 100 mg/dL / Nitrite: Negative   Leuk Esterase: Trace / RBC: 3-5 /HPF / WBC 6-10   Sq Epi: x / Non Sq Epi: Few / Bacteria: Few     Carried

## 2024-10-10 NOTE — ED ADULT NURSE NOTE - [facility] EDN DISCHARGE ASSESSMENT
----- Message from Shahrzad LARKIN sent at 10/9/2024 12:36 PM EDT -----  Regarding: [facility] Appointment Request  [facility] Appointment Request    Patient needs appointment for [facility] Appointment Type: New to Provider.    Patient Requested Dates(s):   Friday as soon as possible   Patient Requested Time: Morning   Provider Name:Jose Hauser MD    Reason for Appointment Request: New Patient - Available appointments did not meet patient need  --------------------------------------------------------------------------------------------------------------------------    Relationship to Patient: Self     Call Back Information: OK to leave message on voicemail/ Would like to receive update via my chart   Preferred Call Back Number: Phone [phone]   Alert and oriented to person, place and time